# Patient Record
Sex: MALE | Race: OTHER
[De-identification: names, ages, dates, MRNs, and addresses within clinical notes are randomized per-mention and may not be internally consistent; named-entity substitution may affect disease eponyms.]

---

## 2017-07-02 ENCOUNTER — HOSPITAL ENCOUNTER (EMERGENCY)
Dept: HOSPITAL 62 - ER | Age: 47
Discharge: ANOTHER HOSPITAL | End: 2017-07-02
Payer: SELF-PAY

## 2017-07-02 VITALS — SYSTOLIC BLOOD PRESSURE: 119 MMHG | DIASTOLIC BLOOD PRESSURE: 75 MMHG
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
## 2017-07-02 DIAGNOSIS — T18.108A: Primary | ICD-10-CM

## 2017-07-02 LAB
ALBUMIN SERPL-MCNC: 4.6 G/DL (ref 3.5–5)
ALP SERPL-CCNC: 89 U/L (ref 38–126)
ALT SERPL-CCNC: 69 U/L (ref 21–72)
ANION GAP SERPL CALC-SCNC: 12 MMOL/L (ref 5–19)
AST SERPL-CCNC: 44 U/L (ref 17–59)
BASOPHILS # BLD AUTO: 0.1 10^3/UL (ref 0–0.2)
BASOPHILS NFR BLD AUTO: 0.6 % (ref 0–2)
BILIRUB DIRECT SERPL-MCNC: 0.3 MG/DL (ref 0–0.4)
BILIRUB SERPL-MCNC: 0.7 MG/DL (ref 0.2–1.3)
BUN SERPL-MCNC: 12 MG/DL (ref 7–20)
CALCIUM: 9.6 MG/DL (ref 8.4–10.2)
CHLORIDE SERPL-SCNC: 101 MMOL/L (ref 98–107)
CO2 SERPL-SCNC: 26 MMOL/L (ref 22–30)
CREAT SERPL-MCNC: 1.05 MG/DL (ref 0.52–1.25)
EOSINOPHIL # BLD AUTO: 0.1 10^3/UL (ref 0–0.6)
EOSINOPHIL NFR BLD AUTO: 1.3 % (ref 0–6)
ERYTHROCYTE [DISTWIDTH] IN BLOOD BY AUTOMATED COUNT: 12.6 % (ref 11.5–14)
GLUCOSE SERPL-MCNC: 89 MG/DL (ref 75–110)
HCT VFR BLD CALC: 49 % (ref 37.9–51)
HGB BLD-MCNC: 16.5 G/DL (ref 13.5–17)
HGB HCT DIFFERENCE: 0.5
LYMPHOCYTES # BLD AUTO: 2.2 10^3/UL (ref 0.5–4.7)
LYMPHOCYTES NFR BLD AUTO: 24.7 % (ref 13–45)
MCH RBC QN AUTO: 31.7 PG (ref 27–33.4)
MCHC RBC AUTO-ENTMCNC: 33.6 G/DL (ref 32–36)
MCV RBC AUTO: 94 FL (ref 80–97)
MONOCYTES # BLD AUTO: 0.8 10^3/UL (ref 0.1–1.4)
MONOCYTES NFR BLD AUTO: 8.7 % (ref 3–13)
NEUTROPHILS # BLD AUTO: 5.8 10^3/UL (ref 1.7–8.2)
NEUTS SEG NFR BLD AUTO: 64.7 % (ref 42–78)
POTASSIUM SERPL-SCNC: 4.1 MMOL/L (ref 3.6–5)
PROT SERPL-MCNC: 8.1 G/DL (ref 6.3–8.2)
RBC # BLD AUTO: 5.19 10^6/UL (ref 4.35–5.55)
SODIUM SERPL-SCNC: 138.7 MMOL/L (ref 137–145)
WBC # BLD AUTO: 8.9 10^3/UL (ref 4–10.5)

## 2017-07-02 PROCEDURE — 99284 EMERGENCY DEPT VISIT MOD MDM: CPT

## 2017-07-02 PROCEDURE — 80053 COMPREHEN METABOLIC PANEL: CPT

## 2017-07-02 PROCEDURE — 71020: CPT

## 2017-07-02 PROCEDURE — 93010 ELECTROCARDIOGRAM REPORT: CPT

## 2017-07-02 PROCEDURE — 85025 COMPLETE CBC W/AUTO DIFF WBC: CPT

## 2017-07-02 PROCEDURE — 96374 THER/PROPH/DIAG INJ IV PUSH: CPT

## 2017-07-02 PROCEDURE — 70360 X-RAY EXAM OF NECK: CPT

## 2017-07-02 PROCEDURE — 36415 COLL VENOUS BLD VENIPUNCTURE: CPT

## 2017-07-02 PROCEDURE — 93005 ELECTROCARDIOGRAM TRACING: CPT

## 2017-07-02 NOTE — RADIOLOGY REPORT (SQ)
EXAM DESCRIPTION:  CHEST PA/LAT; SOFT TISSUE NECK



COMPLETED DATE/TIME:  7/2/2017 4:39 pm



REASON FOR STUDY:  Pt swallowed metal from wire brush



COMPARISON:  None.



FINDINGS:  Two views of the neck for soft tissue detail:  Reveal a roughly 9 mm curvilinear metallic 
presumed foreign body, lateral view only posterior to the thyroid cartilage.  Presumably correlating 
with the ingested metallic wire.

Two view chest:  Clear lungs.  No other foreign bodies identified.  No pneumothorax.



IMPRESSION:  Presumed foreign body, lateral view only, neck image.  This appears to be in the upper e
sophagus.



TECHNICAL DOCUMENTATION:  JOB ID:  [id number]

## 2017-07-02 NOTE — ER DOCUMENT REPORT
HPI





- HPI


Pain Level: 5


Notes: 


Patient is a 46-year-old male presents to the ED complaining of possible metal 

foreign body ingestion yesterday while eating a hot dog.  Patient states that 

he believes a piece of metal from his wire brush ended up in his food that he 

swallowed.  Patient states he can feel something in his throat near the base of 

his neck when he turns his head to the left he can also feel poking.  Patient 

states he still able to eat and drink every time he swallows he feels like 

there is something stuck in his throat.  Otherwise he is still eating and 

drinking with no problems.  Denies any fever, headaches, URI, sore throat, cough

, wheeze, dyspnea, shortness of breath, chest pain, palpitations, syncope, 

abdominal pain, nausea/vomiting/diarrhea, dysuria, or rash.





- ROS


Notes: 


REVIEW OF SYSTEMS:


CONSTITUTIONAL :  Denies fever,  chills, or sweats.  Denies recent illness.


EENT:   see HPI


CARDIOVASCULAR:  Denies chest pain.  Denies palpitations or racing or irregular 

heart beat.  Denies ankle edema.


RESPIRATORY:  Denies cough, cold, or chest congestion.  Denies shortness of 

breath, difficulty breathing, or wheezing.


GASTROINTESTINAL:  Denies abdominal pain or distention.  Denies nausea, vomiting

, or diarrhea.  Denies blood in vomitus, stools, or per rectum.  Denies black, 

tarry stools.  Denies constipation.  


GENITOURINARY:  Denies difficulty urinating, painful urination, burning, 

frequency, blood in urine, or discharge.


MUSCULOSKELETAL:  Denies back or neck pain or stiffness.  Denies joint pain or 

swelling.


SKIN:   Denies rash, lesions or sores.


NEUROLOGICAL:  Denies dizziness or lightheadedness.  Denies headache.   Denies 

problems with gait or speech.  Denies sensory loss, numbness, or tingling.  





ALL OTHER SYSTEMS REVIEWED AND NEGATIVE.





Dictation was performed using Dragon voice recognition software





- DERM


Skin Color: Normal





Past Medical History





- Social History


Smoking Status: Unknown if Ever Smoked


Family History: Reviewed & Not Pertinent, Other - Adopted


Patient has suicidal ideation: No


Patient has homicidal ideation: No





- Past Medical History


Cardiac Medical History: 


   Denies: Hx Coronary Artery Disease, Hx Heart Attack, Hx Hypertension


Pulmonary Medical History: 


   Denies: Hx Asthma, Hx Bronchitis, Hx COPD, Hx Pneumonia


Neurological Medical History: Denies: Hx Cerebrovascular Accident, Hx Seizures


Renal/ Medical History: Denies: Hx Peritoneal Dialysis


GI Medical History: Reports: Hx Gastroesophageal Reflux Disease


Musculoskeltal Medical History: Reports Hx Arthritis - ankle from injury


Past Surgical History: Reports: Hx Orthopedic Surgery - left ankle





- Immunizations


Hx Diphtheria, Pertussis, Tetanus Vaccination: No





Vertical Provider Document





- CONSTITUTIONAL


Notes: 


PHYSICAL EXAMINATION:





GENERAL: Well-appearing, well-nourished and in no acute distress.





HEAD: Atraumatic, normocephalic.





EYES: Pupils equal round and reactive to light, extraocular movements intact, 

sclera anicteric, conjunctiva are normal.





ENT: EAC clear b/l.  TM's intact b/l without erythema, fluid, or perforation.  

Nares patent and without discharge.  oropharynx clear without exudates.  No 

tonsilar hypertrophy or erythema.  Moist mucous membranes.  No sinus tenderness.





NECK: Normal range of motion, supple without lymphadenopathy.  No rigidity.  Non

-tender.





LUNGS: Breath sounds clear to auscultation bilaterally and equal.  No wheezes 

rales or rhonchi.





HEART: Regular rate and rhythm without murmurs, rubs, gallops.





ABDOMEN: Soft, nontender, nondistended abdomen.  No guarding, no rebound.  No 

masses appreciated.  Normal bowel sounds present.  No CVA tenderness 

bilaterally.





PSYCH: Normal mood, normal affect.





SKIN: Warm, Dry, normal turgor, no rashes or lesions noted.





- INFECTION CONTROL


TRAVEL OUTSIDE OF THE [place] IN LAST 30 DAYS: No





- RESPIRATORY


O2 Sat by Pulse Oximetry: 98





Course





- Re-evaluation


Re-evalutation: 


07/02/17 1900


Reviewed case with Dr. Madera:


Patient is an afebrile, well-hydrated, 46-year-old male who presents to the ED 

with a foreign body to his esophagus suspected to be the reported piece of 

metal from his wire brush.  Vitals are stable.  PE otherwise unremarkable.  

There is no respiratory compromise or distress at this time.  X-rays of the 

neck and the chest show that there is an 8.5-9 mm foreign body to the upper 

esophagus near the base of his neck.  Patient warrants transfer as our general 

surgeon does not do scopes.  CBC, CMP, EKG were ordered and all negative/

unremarkable.  Discussed case with Dr. Janusz Gallo, ENT, of [facility] who accepted patient on an ED to ED transfer.  Patient 

is in agreement with plan.  Risks and benefits reviewed about transfer and 

needing to get the foreign body out of his esophagus including inability to pass

, tears, puncture wound(s), and infection.  








07/02/17 20:30


Pt was re-evaluated, vital signs stable, no current complaints. EMS arrived and 

pt is stable for transport.











- Vital Signs


Vital signs: 


 











Temp Pulse Resp BP Pulse Ox


 


 97.7 F   81   24 H  143/82 H  98 


 


 07/02/17 15:36  07/02/17 15:36  07/02/17 15:36  07/02/17 15:36  07/02/17 15:36














- Laboratory


Result Diagrams: 


 07/02/17 18:15





 07/02/17 18:15





Discharge





- Discharge


Clinical Impression: 


Foreign body in esophagus


Qualifiers:


 Encounter type: initial encounter Qualified Code(s): T18.108A - Unspecified 

foreign body in esophagus causing other injury, initial encounter





Condition: Stable


Disposition: [facility]


Additional Instructions: 


Instructions per ENT after Transfer and Procedure.

## 2017-07-02 NOTE — RADIOLOGY REPORT (SQ)
EXAM DESCRIPTION:  CHEST PA/LAT; SOFT TISSUE NECK



COMPLETED DATE/TIME:  7/2/2017 4:39 pm



REASON FOR STUDY:  Pt swallowed metal from wire brush



COMPARISON:  None.



FINDINGS:  Two views of the neck for soft tissue detail:  Reveal a roughly 9 mm curvilinear metallic 
presumed foreign body, lateral view only posterior to the thyroid cartilage.  Presumably correlating 
with the ingested metallic wire.

Two view chest:  Clear lungs.  No other foreign bodies identified.  No pneumothorax.



IMPRESSION:  Presumed foreign body, lateral view only, neck image.  This appears to be in the upper e
sophagus.



TECHNICAL DOCUMENTATION:  JOB ID:  [id number]

## 2017-07-02 NOTE — EKG REPORT
SEVERITY:- OTHERWISE NORMAL ECG -

SINUS RHYTHM

LEFT AXIS DEVIATION

:

Confirmed by: John Huerta 02-Jul-2017 21:39:55

## 2020-07-06 ENCOUNTER — HOSPITAL ENCOUNTER (EMERGENCY)
Dept: HOSPITAL 62 - ER | Age: 50
Discharge: HOME | End: 2020-07-06
Payer: COMMERCIAL

## 2020-07-06 VITALS — DIASTOLIC BLOOD PRESSURE: 93 MMHG | SYSTOLIC BLOOD PRESSURE: 148 MMHG

## 2020-07-06 DIAGNOSIS — M10.9: ICD-10-CM

## 2020-07-06 DIAGNOSIS — K59.00: ICD-10-CM

## 2020-07-06 DIAGNOSIS — R10.9: ICD-10-CM

## 2020-07-06 DIAGNOSIS — K52.9: Primary | ICD-10-CM

## 2020-07-06 LAB
ADD MANUAL DIFF: NO
ALBUMIN SERPL-MCNC: 4.8 G/DL (ref 3.5–5)
ALP SERPL-CCNC: 87 U/L (ref 38–126)
ANION GAP SERPL CALC-SCNC: 9 MMOL/L (ref 5–19)
APPEARANCE UR: CLEAR
APTT PPP: YELLOW S
AST SERPL-CCNC: 35 U/L (ref 17–59)
BARBITURATES UR QL SCN: NEGATIVE
BASOPHILS # BLD AUTO: 0.1 10^3/UL (ref 0–0.2)
BASOPHILS NFR BLD AUTO: 0.5 % (ref 0–2)
BILIRUB DIRECT SERPL-MCNC: 0 MG/DL (ref 0–0.4)
BILIRUB SERPL-MCNC: 1 MG/DL (ref 0.2–1.3)
BILIRUB UR QL STRIP: NEGATIVE
BUN SERPL-MCNC: 17 MG/DL (ref 7–20)
CALCIUM: 9.7 MG/DL (ref 8.4–10.2)
CHLORIDE SERPL-SCNC: 101 MMOL/L (ref 98–107)
CO2 SERPL-SCNC: 25 MMOL/L (ref 22–30)
EOSINOPHIL # BLD AUTO: 0.1 10^3/UL (ref 0–0.6)
EOSINOPHIL NFR BLD AUTO: 0.8 % (ref 0–6)
ERYTHROCYTE [DISTWIDTH] IN BLOOD BY AUTOMATED COUNT: 12.7 % (ref 11.5–14)
ETHANOL SERPL-MCNC: < 10 MG/DL
GLUCOSE SERPL-MCNC: 109 MG/DL (ref 75–110)
GLUCOSE UR STRIP-MCNC: NEGATIVE MG/DL
HCT VFR BLD CALC: 45.1 % (ref 37.9–51)
HGB BLD-MCNC: 16 G/DL (ref 13.5–17)
KETONES UR STRIP-MCNC: NEGATIVE MG/DL
LYMPHOCYTES # BLD AUTO: 1.1 10^3/UL (ref 0.5–4.7)
LYMPHOCYTES NFR BLD AUTO: 10 % (ref 13–45)
MCH RBC QN AUTO: 32.8 PG (ref 27–33.4)
MCHC RBC AUTO-ENTMCNC: 35.4 G/DL (ref 32–36)
MCV RBC AUTO: 93 FL (ref 80–97)
METHADONE UR QL SCN: NEGATIVE
MONOCYTES # BLD AUTO: 0.9 10^3/UL (ref 0.1–1.4)
MONOCYTES NFR BLD AUTO: 7.6 % (ref 3–13)
NEUTROPHILS # BLD AUTO: 9.3 10^3/UL (ref 1.7–8.2)
NEUTS SEG NFR BLD AUTO: 81.1 % (ref 42–78)
NITRITE UR QL STRIP: NEGATIVE
PCP UR QL SCN: NEGATIVE
PH UR STRIP: 6 [PH] (ref 5–9)
PLATELET # BLD: 141 10^3/UL (ref 150–450)
POTASSIUM SERPL-SCNC: 3.9 MMOL/L (ref 3.6–5)
PROT SERPL-MCNC: 8.1 G/DL (ref 6.3–8.2)
PROT UR STRIP-MCNC: NEGATIVE MG/DL
RBC # BLD AUTO: 4.87 10^6/UL (ref 4.35–5.55)
SP GR UR STRIP: 1.02
TOTAL CELLS COUNTED % (AUTO): 100 %
URATE SERPL-MCNC: 9.5 MG/DL (ref 3.5–8.5)
URINE AMPHETAMINES SCREEN: NEGATIVE
URINE BENZODIAZEPINES SCREEN: NEGATIVE
URINE COCAINE SCREEN: NEGATIVE
URINE MARIJUANA (THC) SCREEN: (no result)
UROBILINOGEN UR-MCNC: NEGATIVE MG/DL (ref ?–2)
WBC # BLD AUTO: 11.4 10^3/UL (ref 4–10.5)

## 2020-07-06 PROCEDURE — 36415 COLL VENOUS BLD VENIPUNCTURE: CPT

## 2020-07-06 PROCEDURE — 83605 ASSAY OF LACTIC ACID: CPT

## 2020-07-06 PROCEDURE — 83690 ASSAY OF LIPASE: CPT

## 2020-07-06 PROCEDURE — 99284 EMERGENCY DEPT VISIT MOD MDM: CPT

## 2020-07-06 PROCEDURE — 80307 DRUG TEST PRSMV CHEM ANLYZR: CPT

## 2020-07-06 PROCEDURE — 80053 COMPREHEN METABOLIC PANEL: CPT

## 2020-07-06 PROCEDURE — 96361 HYDRATE IV INFUSION ADD-ON: CPT

## 2020-07-06 PROCEDURE — 84550 ASSAY OF BLOOD/URIC ACID: CPT

## 2020-07-06 PROCEDURE — 85025 COMPLETE CBC W/AUTO DIFF WBC: CPT

## 2020-07-06 PROCEDURE — 74177 CT ABD & PELVIS W/CONTRAST: CPT

## 2020-07-06 PROCEDURE — 96376 TX/PRO/DX INJ SAME DRUG ADON: CPT

## 2020-07-06 PROCEDURE — 96365 THER/PROPH/DIAG IV INF INIT: CPT

## 2020-07-06 PROCEDURE — 81001 URINALYSIS AUTO W/SCOPE: CPT

## 2020-07-06 PROCEDURE — 96375 TX/PRO/DX INJ NEW DRUG ADDON: CPT

## 2020-07-06 NOTE — RADIOLOGY REPORT (SQ)
EXAM DESCRIPTION:  CT ABD/PELVIS WITH IV   ORAL



IMAGES COMPLETED DATE/TIME:  7/6/2020 10:54 am



REASON FOR STUDY:  left lower quadrant pain/prior diverticulitis

 .  Right and left lower quadrant abdominal pain.



COMPARISON:  1/5/2016, 8/24/2015



TECHNIQUE:  CT scan of the abdomen and pelvis performed using helical scanning technique with dynamic
 intravenous contrast injection.  No oral contrast. Images reviewed with lung, soft tissue, and bone 
windows. Reconstructed coronal and sagittal MPR images reviewed. Delayed images for evaluation of the
 urinary system also acquired. All images stored on PACS.

All CT scanners at this facility use dose modulation, iterative reconstruction, and/or weight based d
osing when appropriate to reduce radiation dose to as low as reasonably achievable (ALARA).

[facility]: Dose Right  [facility]: CareDose    [facility]: Dose Right    [facility]: Teradose 4D    [facility]: Smart Technologies



CONTRAST TYPE AND DOSE:  contrast/concentration: Isovue 350.00 mmol/ml; Total Contrast Delivered: 100
.0 ml; Total Saline Delivered: 70.0 ml



RENAL FUNCTION:  GFR > 60.



RADIATION DOSE:  CT Rad equipment meets quality standard of care and radiation dose reduction techniq
ues were employed. CTDIvol: 17.0 - 20.1 mGy. DLP: 2090 mGy-cm..



LIMITATIONS:  None



FINDINGS:  LOWER CHEST: No significant findings. No nodules or infiltrates.

LIVER: The liver is enlarged measuring 26.3 cm craniocaudal at the midclavicular line.  Moderate diff
use hepatic steatosis.  1 cm hepatic cyst in the right hepatic lobe is unchanged.  No suspicious hepa
tic mass.  Hepatic and portal veins are patent.  No biliary ductal dilation.

SPLEEN: The spleen is mildly enlarged measuring 13.4 cm craniocaudal at the midclavicular line.  No f
ocal mass or perisplenic fluid.

PANCREAS: No masses. No significant calcifications. No adjacent inflammation or peripancreatic fluid 
collections. Pancreatic duct not dilated.

GALLBLADDER: No identified stones by CT criteria. No inflammatory changes to suggest cholecystitis.

ADRENAL GLANDS: No significant masses or asymmetry.

RIGHT KIDNEY AND URETER: Right inferior pole parapelvic cyst.  No solid mass.   No significant calcif
ications.   No hydronephrosis or hydroureter.

LEFT KIDNEY AND URETER: No solid masses.   No significant calcifications.   No hydronephrosis or hydr
oureter.

AORTA AND VESSELS: No aneurysm. No dissection. Renal arteries, SMA, celiac without stenosis.

RETROPERITONEUM: No retroperitoneal adenopathy, hemorrhage or masses.

BOWEL AND PERITONEAL CAVITY: There is sigmoid colon wall thickening and surrounding inflammatory ernst
ge involving a long segment of the sigmoid colon to the level of the rectum.  A few scattered colonic
 diverticula without acute diverticulitis.  There is no bowel obstruction.  The proximal colon has a 
normal appearance.  Small hiatal hernia.  No ascites or pneumoperitoneum.

APPENDIX: Normal.

PELVIS: Prostate has normal size.  Urinary bladder is relatively decompressed.  Despite underdistenti
on, there is diffuse bladder wall thickening, which may be reactive secondary to inflammatory change 
in the pelvis.  Bladder wall measures up to 11 mm thickness.

ABDOMINAL WALL: No masses. No hernias.

BONES: No significant or acute findings.

OTHER: No other significant finding.



IMPRESSION:

1. Acute sigmoid colitis, which could be seen with infectious or inflammatory colitis such as Crohn d
isease.  Clinical correlation and correlation with endoscopy after treatment for acute inflammatory c
hange is recommended.  No perforation or peritoneal abscess.

2. Bladder wall thickening which may be reactive secondary to the inflammatory change adjacent at the
 sigmoid colon.  No focal bladder mass.

3. Small hiatal hernia.



TECHNICAL DOCUMENTATION:  JOB ID:  [id number]

Quality ID # [id number]: Final reports with documentation of one or more dose reduction techniques (e.g., Au
tomated exposure control, adjustment of the mA and/or kV according to patient size, use of iterative 
reconstruction technique)

 2011 [employer]- All Rights Reserved



Reading location - IP/workstation name: [id number]

## 2020-07-06 NOTE — ER DOCUMENT REPORT
Entered by CATHERINE RODRÍGUEZ SCRIBE  07/06/20 0916 





Acting as scribe for:MARLENE JACKSON MD





ED GI/





- General


Chief Complaint: Abdominal Pain


Stated Complaint: ABDOMINAL AND BACK PAIN


Time Seen by Provider: 07/06/20 08:39


Information source: Patient


Notes: 





This 49 year old male patient presents to the emergency department today with 

complaints of abdominal pain. Patient states he has a history of diverticulitis 

and this pain is similar. Patient states his urine is normal and reports 

constipation. Patient states he feels like he needs to pass a bowel movement 

every x30 min and strains. Patient states he mostly passes gas and denies blood 

in his stool. Patient states his bowel movement was normal x3 days ago and began

to have problems yesterday. Denies fever, chills, or vomiting. Patient also 

reports some pain in his right foot and denies injury.


TRAVEL OUTSIDE OF THE [place] IN LAST 30 DAYS: No





- Related Data


Allergies/Adverse Reactions: 


                                        





No Known Allergies Allergy (Verified 07/02/17 15:36)


   











Past Medical History





- General


Information source: Patient





- Social History


Smoking Status: Never Smoker


Cigarette use (# per day): No


Chew tobacco use (# tins/day): No


Frequency of alcohol use: Occasional


Drug Abuse: Marijuana


Family History: Reviewed & Not Pertinent, Other - Adopted


Patient has homicidal ideation: No


GI Medical History: Reports: Hx Diverticulitis, Hx Gastroesophageal Reflux 

Disease


Musculoskeletal Medical History: Reports Hx Arthritis - ankle from injury


Past Surgical History: Reports: Hx Orthopedic Surgery - left ankle





- Immunizations


Hx Diphtheria, Pertussis, Tetanus Vaccination: No





Review of Systems





- Review of Systems


Constitutional: See HPI.  denies: Chills, Fever


EENT: No symptoms reported


Cardiovascular: No symptoms reported


Respiratory: No symptoms reported


Gastrointestinal: See HPI, Abdominal pain, Constipation.  denies: Vomiting, 

Blood streaked bowels


Genitourinary: See HPI


Male Genitourinary: No symptoms reported


Musculoskeletal: See HPI


Skin: No symptoms reported


Hematologic/Lymphatic: No symptoms reported


Neurological/Psychological: No symptoms reported


-: Yes All other systems reviewed and negative





Physical Exam





- Vital signs


Vitals: 


                                        











Temp Pulse Resp BP Pulse Ox


 


 98.5 F   89   18   155/81 H  99 


 


 07/06/20 06:38  07/06/20 06:38  07/06/20 06:38  07/06/20 06:38  07/06/20 06:38














- General


General appearance: Appears well, Alert





- HEENT


Head: Normocephalic, Atraumatic


Eyes: Normal


Pupils: PERRL





- Respiratory


Respiratory status: No respiratory distress


Chest status: Nontender


Breath sounds: Normal


Chest palpation: Normal





- Cardiovascular


Rhythm: Regular


Heart sounds: Normal auscultation


Murmur: No





- Abdominal


Inspection: Normal


Distension: No distension


Bowel sounds: Normal


Tenderness: Tender - LLQ, Guarding.  No: Rebound


Notes: 


Psoriasis rash on abdomen.





- Extremities


General upper extremity: Normal inspection.  No: Edema


General lower extremity: Normal inspection.  No: Edema





- Neurological


Neuro grossly intact: Yes


Cognition: Normal


Orientation: AAOx4


Speech: Normal





- Psychological


Associated symptoms: Normal affect, Normal mood





- Skin


Skin Temperature: Warm


Skin Moisture: Dry


Skin Color: Normal





Course





- Re-evaluation


Re-evalutation: 





07/06/20 16:49


Patient reports his pain is better however still present in the left lower q

uadrant region.  Patient was diagnosed with colitis based on CT scan of abdomen 

and pelvis.  Patient has colitis of the sigmoid colon.  This is a new diagnosis 

for patient inasmuch as he has had diverticulitis in the past.  Patient does not

have any rectal bleeding.





- Vital Signs


Vital signs: 


                                        











Temp Pulse Resp BP Pulse Ox


 


 98.1 F   80   16   152/82 H  99 


 


 07/06/20 10:49  07/06/20 10:49  07/06/20 10:49  07/06/20 10:49  07/06/20 10:49











07/06/20 16:50


Vital signs stable





- Laboratory


Result Diagrams: 


                                 07/06/20 08:20





                                 07/06/20 08:20


Laboratory results interpreted by me: 


                                        











  07/06/20 07/06/20 07/06/20





  08:20 08:20 08:20


 


WBC  11.4 H  


 


Plt Count  141 L  


 


Lymph % (Auto)  10.0 L  


 


Absolute Neuts (auto)  9.3 H  


 


Seg Neutrophils %  81.1 H  


 


Sodium   135.2 L 


 


Uric Acid    9.5 H











07/06/20 16:50


Laboratories within normal limits patient has a uric acid level of 9.5.





- Diagnostic Test


Radiology reviewed: Image reviewed, Reports reviewed


Radiology results interpreted by me: 





07/06/20 16:50


CT scan of abdomen and pelvis with contrast IV and oral shows sigmoid colon 

colitis noted.  No perforation no other significant findings.





Discharge





- Discharge


Clinical Impression: 


 Colitis, Abdominal pain, Gouty arthritis of right great toe





Condition: Stable


Disposition: HOME, SELF-CARE


Instructions:  Abdominal Pain ([facility]), Bulk Laxatives, Oral Narcotic Medication 

([facility])


Additional Instructions: 


.  Colitis, Nonspecific





     Colitis is an inflammatory disease of the large intestine which affects the

lining of the bowel.  The cause is uncertain, though it is often caused by an 

infection.  In some cases, the symptoms resolve and can return again in the 

future.


     Colitis is characterized by abdominal pain, often nausea and vomiting, and 

either diarrhea or difficulty with bowel movements.  Sometimes blood will be 

present in the bowel movements.  Fever is often present as well.


     Milder cases of colitis can be managed as an outpatient with medications 

for nausea and vomiting and pain, oral fluid therapy, and perhaps antibiotics, 

if a bacterial origin is suspected.  Antidiarrhea medicine should usually be 

avoided in colitis.


     If you have increasing abdominal pain, repeated vomiting, fever, rectal 

bleeding, or worsening diarrhea, you should return for re-evaluation.





At this point time you have a nonspecific colitis.  You are going to need 

follow-up with the gastrointestinal specialist which we will refer you to.  We 

will place you on antibiotics medications and bulk laxatives as needed.  Also 

you will be on a treatment for uric acid elevation as well.


Prescriptions: 


Ciprofloxacin HCl [Cipro 500 mg Tablet] 500 mg PO BID #20 tablet


Docusate Sodium [Colace 100 mg Capsule] 100 mg PO BID #60 capsule


Hydrocodone/Acetaminophen [Norco 5-325 mg Tablet] 1 tab PO QID PRN #10 tablet


 PRN Reason: 


Allopurinol [Zyloprim 300 mg Tablet] 300 mg PO DAILY #30 tablet


Forms:  Return to Work


Referrals: 


ZANDER WISEMAN MD [ACTIVE STAFF] - Follow up in 3-5 days





I personally performed the services described in the documentation, reviewed and

edited the documentation which was dictated to the scribe in my presence, and it

accurately records my words and actions.

## 2020-07-14 ENCOUNTER — HOSPITAL ENCOUNTER (INPATIENT)
Dept: HOSPITAL 62 - ER | Age: 50
LOS: 3 days | Discharge: HOME | DRG: 392 | End: 2020-07-17
Attending: NURSE PRACTITIONER | Admitting: INTERNAL MEDICINE
Payer: COMMERCIAL

## 2020-07-14 DIAGNOSIS — F12.90: ICD-10-CM

## 2020-07-14 DIAGNOSIS — K21.9: ICD-10-CM
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
## 2020-07-14 DIAGNOSIS — D72.829: ICD-10-CM

## 2020-07-14 DIAGNOSIS — F17.210: ICD-10-CM

## 2020-07-14 DIAGNOSIS — K57.32: Primary | ICD-10-CM

## 2020-07-14 DIAGNOSIS — F10.20: ICD-10-CM

## 2020-07-14 LAB
ADD MANUAL DIFF: NO
ALBUMIN SERPL-MCNC: 4.7 G/DL (ref 3.5–5)
ALP SERPL-CCNC: 95 U/L (ref 38–126)
ANION GAP SERPL CALC-SCNC: 9 MMOL/L (ref 5–19)
APPEARANCE UR: (no result)
APTT PPP: (no result) S
AST SERPL-CCNC: 31 U/L (ref 17–59)
BARBITURATES UR QL SCN: NEGATIVE
BASOPHILS # BLD AUTO: 0.1 10^3/UL (ref 0–0.2)
BASOPHILS NFR BLD AUTO: 0.5 % (ref 0–2)
BILIRUB DIRECT SERPL-MCNC: 0 MG/DL (ref 0–0.4)
BILIRUB SERPL-MCNC: 1.2 MG/DL (ref 0.2–1.3)
BILIRUB UR QL STRIP: NEGATIVE
BUN SERPL-MCNC: 16 MG/DL (ref 7–20)
CALCIUM: 10.1 MG/DL (ref 8.4–10.2)
CHLORIDE SERPL-SCNC: 101 MMOL/L (ref 98–107)
CO2 SERPL-SCNC: 26 MMOL/L (ref 22–30)
EOSINOPHIL # BLD AUTO: 0.1 10^3/UL (ref 0–0.6)
EOSINOPHIL NFR BLD AUTO: 0.5 % (ref 0–6)
ERYTHROCYTE [DISTWIDTH] IN BLOOD BY AUTOMATED COUNT: 12 % (ref 11.5–14)
GLUCOSE SERPL-MCNC: 117 MG/DL (ref 75–110)
GLUCOSE UR STRIP-MCNC: NEGATIVE MG/DL
HCT VFR BLD CALC: 44.5 % (ref 37.9–51)
HGB BLD-MCNC: 15.7 G/DL (ref 13.5–17)
KETONES UR STRIP-MCNC: NEGATIVE MG/DL
LYMPHOCYTES # BLD AUTO: 1.1 10^3/UL (ref 0.5–4.7)
LYMPHOCYTES NFR BLD AUTO: 7.5 % (ref 13–45)
MCH RBC QN AUTO: 32.6 PG (ref 27–33.4)
MCHC RBC AUTO-ENTMCNC: 35.2 G/DL (ref 32–36)
MCV RBC AUTO: 93 FL (ref 80–97)
METHADONE UR QL SCN: NEGATIVE
MONOCYTES # BLD AUTO: 1.4 10^3/UL (ref 0.1–1.4)
MONOCYTES NFR BLD AUTO: 9.8 % (ref 3–13)
NEUTROPHILS # BLD AUTO: 12 10^3/UL (ref 1.7–8.2)
NEUTS SEG NFR BLD AUTO: 81.7 % (ref 42–78)
NITRITE UR QL STRIP: NEGATIVE
PCP UR QL SCN: NEGATIVE
PH UR STRIP: 5 [PH] (ref 5–9)
PLATELET # BLD: 250 10^3/UL (ref 150–450)
POTASSIUM SERPL-SCNC: 4.1 MMOL/L (ref 3.6–5)
PROT SERPL-MCNC: 8.2 G/DL (ref 6.3–8.2)
PROT UR STRIP-MCNC: 30 MG/DL
RBC # BLD AUTO: 4.81 10^6/UL (ref 4.35–5.55)
SP GR UR STRIP: 1.03
TOTAL CELLS COUNTED % (AUTO): 100 %
URINE AMPHETAMINES SCREEN: NEGATIVE
URINE BENZODIAZEPINES SCREEN: NEGATIVE
URINE COCAINE SCREEN: NEGATIVE
URINE MARIJUANA (THC) SCREEN: (no result)
UROBILINOGEN UR-MCNC: NEGATIVE MG/DL (ref ?–2)
WBC # BLD AUTO: 14.7 10^3/UL (ref 4–10.5)

## 2020-07-14 PROCEDURE — 96375 TX/PRO/DX INJ NEW DRUG ADDON: CPT

## 2020-07-14 PROCEDURE — 89055 LEUKOCYTE ASSESSMENT FECAL: CPT

## 2020-07-14 PROCEDURE — 80048 BASIC METABOLIC PNL TOTAL CA: CPT

## 2020-07-14 PROCEDURE — 85025 COMPLETE CBC W/AUTO DIFF WBC: CPT

## 2020-07-14 PROCEDURE — 82270 OCCULT BLOOD FECES: CPT

## 2020-07-14 PROCEDURE — 87045 FECES CULTURE AEROBIC BACT: CPT

## 2020-07-14 PROCEDURE — 74177 CT ABD & PELVIS W/CONTRAST: CPT

## 2020-07-14 PROCEDURE — 80053 COMPREHEN METABOLIC PANEL: CPT

## 2020-07-14 PROCEDURE — 81001 URINALYSIS AUTO W/SCOPE: CPT

## 2020-07-14 PROCEDURE — 85027 COMPLETE CBC AUTOMATED: CPT

## 2020-07-14 PROCEDURE — 93010 ELECTROCARDIOGRAM REPORT: CPT

## 2020-07-14 PROCEDURE — 83690 ASSAY OF LIPASE: CPT

## 2020-07-14 PROCEDURE — 93005 ELECTROCARDIOGRAM TRACING: CPT

## 2020-07-14 PROCEDURE — 87040 BLOOD CULTURE FOR BACTERIA: CPT

## 2020-07-14 PROCEDURE — 36415 COLL VENOUS BLD VENIPUNCTURE: CPT

## 2020-07-14 PROCEDURE — 96361 HYDRATE IV INFUSION ADD-ON: CPT

## 2020-07-14 PROCEDURE — 87205 SMEAR GRAM STAIN: CPT

## 2020-07-14 PROCEDURE — 96367 TX/PROPH/DG ADDL SEQ IV INF: CPT

## 2020-07-14 PROCEDURE — 99285 EMERGENCY DEPT VISIT HI MDM: CPT

## 2020-07-14 PROCEDURE — 96365 THER/PROPH/DIAG IV INF INIT: CPT

## 2020-07-14 PROCEDURE — 80307 DRUG TEST PRSMV CHEM ANLYZR: CPT

## 2020-07-14 PROCEDURE — 83605 ASSAY OF LACTIC ACID: CPT

## 2020-07-14 PROCEDURE — 96374 THER/PROPH/DIAG INJ IV PUSH: CPT

## 2020-07-14 PROCEDURE — 96376 TX/PRO/DX INJ SAME DRUG ADON: CPT

## 2020-07-14 PROCEDURE — 71045 X-RAY EXAM CHEST 1 VIEW: CPT

## 2020-07-14 RX ADMIN — OXYCODONE AND ACETAMINOPHEN PRN TAB: 5; 325 TABLET ORAL at 20:05

## 2020-07-14 RX ADMIN — DIAZEPAM SCH MG: 5 TABLET ORAL at 23:32

## 2020-07-14 RX ADMIN — HYDROMORPHONE HYDROCHLORIDE PRN MG: 2 INJECTION INTRAMUSCULAR; INTRAVENOUS; SUBCUTANEOUS at 17:23

## 2020-07-14 RX ADMIN — PIPERACILLIN AND TAZOBACTAM SCH MLS/HR: 3; .375 INJECTION, POWDER, LYOPHILIZED, FOR SOLUTION INTRAVENOUS; PARENTERAL at 19:50

## 2020-07-14 RX ADMIN — PIPERACILLIN AND TAZOBACTAM SCH MLS/HR: 3; .375 INJECTION, POWDER, LYOPHILIZED, FOR SOLUTION INTRAVENOUS; PARENTERAL at 23:32

## 2020-07-14 RX ADMIN — DIAZEPAM SCH MG: 5 TABLET ORAL at 18:34

## 2020-07-14 RX ADMIN — HYDROMORPHONE HYDROCHLORIDE PRN MG: 2 INJECTION INTRAMUSCULAR; INTRAVENOUS; SUBCUTANEOUS at 13:52

## 2020-07-14 RX ADMIN — FAMOTIDINE SCH MG: 20 TABLET, FILM COATED ORAL at 21:36

## 2020-07-14 RX ADMIN — HEPARIN SODIUM SCH: 5000 INJECTION, SOLUTION INTRAVENOUS; SUBCUTANEOUS at 21:37

## 2020-07-14 NOTE — RADIOLOGY REPORT (SQ)
EXAM DESCRIPTION:  CHEST SINGLE VIEW



IMAGES COMPLETED DATE/TIME:  7/14/2020 5:30 pm



REASON FOR STUDY:  abdominal pain/admission



COMPARISON:  7/2/2017



EXAM PARAMETERS:  NUMBER OF VIEWS: One view.

TECHNIQUE: Single frontal radiographic view of the chest acquired.

RADIATION DOSE: NA

LIMITATIONS: None.



FINDINGS:  LUNGS AND PLEURA: No opacities, masses or pneumothorax. No pleural effusion.

MEDIASTINUM AND HILAR STRUCTURES: No masses.  Contour normal.

HEART AND VASCULAR STRUCTURES: Heart normal in size.  Normal vasculature.

BONES: No acute findings.

HARDWARE: None in the chest.

OTHER: No other significant finding.



IMPRESSION:  1. NO ACUTE RADIOGRAPHIC FINDING IN THE CHEST.



TECHNICAL DOCUMENTATION:  JOB ID:  [id number]

 2011 [email or web address]- All Rights Reserved



Reading location - IP/workstation name: [email or web address]

## 2020-07-14 NOTE — PDOC H&P
History of Present Illness


Admission Date/PCP: 


  07/14/20 16:05





  





Patient complains of: abd pain


History of Present Illness: 


LYNDSEY CISNEROS is a 49 year old male with a past medical history significant

for diverticulitis with perforation, alcohol dependence with continuous use, and

marijuana use who presents to the emergency department today with a complaint of

1 week of progressively worsening abdominal discomfort.  Patient was seen 7 days

ago in the emergency department and found to have diverticulitis by CT; 

discharged home on Cipro.  Patient reports that he initially improved x2 days 

and then has steadily worsened since.  Reports small-volume, soft stools, 

associated with abdominal cramping.  He denies nausea vomiting diarrhea and 

constipation.





Evaluation emergency department revealed stable vital signs, leukocytosis (WBC 

14.7; increased from last week), unremarkable chemistry, normal urinalysis, 

negative occult stool, and UDS positive for THC only.





CT imaging showed diverticulitis.  Per ED provider, he discussed with surgery 

who recommended hospitalist admission.


He was provided IV Zosyn and vancomycin and referred to the hospitalist service 

for admission and management of the above complaints and findings.








Past Medical History


Cardiac Medical History: Reports: None


Pulmonary Medical History: Reports: None


EENT Medical History: Reports: None


Neurological Medical History: Reports: None


Endocrine Medical History: Reports: Obesity


Renal/ Medical History: Reports: None


Malignancy Medical History: Reports: None


GI Medical History: Reports: Diverticulitis, Gastroesophageal Reflux Disease


Musculoskeltal Medical History: Reports: Arthritis - ankle from injury


Skin Medical History: Reports: None


Psychiatric Medical History: Reports: Alcohol Dependency, Substance Abuse, 

Tobacco Dependency


Traumatic Medical History: Reports: None


Hematology: 


   Denies: Anemia


Infectious Medical History: Reports: None





Past Surgical History


Past Surgical History: Reports: Orthopedic Surgery - left ankle, Other - 

Percutaneous drain placement related to perforated diverticulum





Social History


Information Source: Patient


Lives with: Alone


Smoking Status: Never Smoker


Electronic Cigarette use?: No


Frequency of Alcohol Use: Heavy


Hx Recreational Drug Use: Yes


Drugs: Marijuana


Hx Prescription Drug Abuse: No





- Advance Directive


Resuscitation Status: Full Code





Family History


Family History: Reviewed & Not Pertinent


Parental Family History Reviewed: Yes


Children Family History Reviewed: Yes


Sibling(s) Family History Reviewed.: Yes





Medication/Allergy


Home Medications: 








Allopurinol [Zyloprim 300 mg Tablet] 300 mg PO DAILY #30 tablet 07/06/20 


Ciprofloxacin HCl [Cipro 500 mg Tablet] 500 mg PO BID #20 tablet 07/06/20 


Docusate Sodium [Colace 100 mg Capsule] 100 mg PO BID #60 capsule 07/06/20 








Allergies/Adverse Reactions: 


                                        





No Known Allergies Allergy (Verified 07/02/17 15:36)


   











Review of Systems


Constitutional: PRESENT: anorexia.  ABSENT: chills, fever(s), headache(s), 

weight gain, weight loss


Eyes: ABSENT: visual disturbances


Ears: ABSENT: hearing changes


Cardiovascular: ABSENT: chest pain, dyspnea on exertion, edema, orthropnea, 

palpitations


Respiratory: ABSENT: cough, hemoptysis


Gastrointestinal: PRESENT: abdominal pain, bloating.  ABSENT: constipation, 

diarrhea, hematemesis, hematochezia, nausea, vomiting


Genitourinary: ABSENT: dysuria, hematuria


Musculoskeletal: ABSENT: joint swelling


Integumentary: ABSENT: rash, wounds


Neurological: ABSENT: abnormal gait, abnormal speech, confusion, dizziness, 

focal weakness, syncope


Psychiatric: ABSENT: anxiety, depression, homidical ideation, suicidal ideation


Endocrine: ABSENT: cold intolerance, heat intolerance, polydipsia, polyuria


Hematologic/Lymphatic: ABSENT: easy bleeding, easy bruising





Physical Exam


Vital Signs: 


                                        











Temp Pulse Resp BP Pulse Ox


 


 98.3 F   84   16   141/72 H  99 


 


 07/14/20 06:45  07/14/20 06:45  07/14/20 06:45  07/14/20 06:45  07/14/20 06:45








                                 Intake & Output











 07/13/20 07/14/20 07/15/20





 06:59 06:59 06:59


 


Intake Total   1000


 


Balance   1000


 


Weight  100.698 kg 











General appearance: PRESENT: no acute distress, well-developed, well-nourished


Head exam: PRESENT: atraumatic, normocephalic


Eye exam: PRESENT: conjunctiva pink, EOMI, PERRLA.  ABSENT: scleral icterus


Mouth exam: PRESENT: moist, tongue midline


Respiratory exam: PRESENT: clear to auscultation stevie, symmetrical, unlabored.  

ABSENT: rales, rhonchi, wheezes


Cardiovascular exam: PRESENT: RRR.  ABSENT: diastolic murmur, rubs, systolic 

murmur


Vascular exam: PRESENT: normal capillary refill


GI/Abdominal exam: PRESENT: hyperactive bowel sounds, soft, tenderness.  ABSENT:

distended, guarding, mass, organolmegaly, rebound


Rectal exam: PRESENT: deferred


Extremities exam: PRESENT: full ROM.  ABSENT: calf tenderness, clubbing, pedal 

edema


Musculoskeletal exam: PRESENT: ambulatory


Neurological exam: PRESENT: alert, awake, oriented to person, oriented to place,

oriented to time, oriented to situation, CN II-XII grossly intact.  ABSENT: 

motor sensory deficit


Psychiatric exam: PRESENT: appropriate affect, normal mood.  ABSENT: homicidal 

ideation, suicidal ideation


Skin exam: PRESENT: dry, intact, warm.  ABSENT: cyanosis, rash





Results


Laboratory Results: 


                                        





                                 07/14/20 08:26 





                                 07/14/20 08:26 





                                        











  07/14/20 07/14/20 07/14/20





  08:24 08:26 08:26


 


WBC   14.7 H 


 


RBC   4.81 


 


Hgb   15.7 


 


Hct   44.5 


 


MCV   93 


 


MCH   32.6 


 


MCHC   35.2 


 


RDW   12.0 


 


Plt Count   250 


 


Seg Neutrophils %   81.7 H 


 


Sodium    136.0 L


 


Potassium    4.1


 


Chloride    101


 


Carbon Dioxide    26


 


Anion Gap    9


 


BUN    16


 


Creatinine    1.07


 


Est GFR ( Amer)    > 60


 


Glucose    117 H


 


Lactic Acid   


 


Calcium    10.1


 


Total Bilirubin    1.2


 


AST    31


 


Alkaline Phosphatase    95


 


Total Protein    8.2


 


Albumin    4.7


 


Lipase    49.3


 


Urine Color  DARK YELLOW  


 


Urine Appearance  SLIGHTLY-CLOUDY  


 


Urine pH  5.0  


 


Ur Specific Gravity  1.026  


 


Urine Protein  30 H  


 


Urine Glucose (UA)  NEGATIVE  


 


Urine Ketones  NEGATIVE  


 


Urine Blood  NEGATIVE  


 


Urine Nitrite  NEGATIVE  


 


Ur Leukocyte Esterase  NEGATIVE  


 


Urine WBC (Auto)  1  


 


Urine RBC (Auto)  0  














  07/14/20





  09:44


 


WBC 


 


RBC 


 


Hgb 


 


Hct 


 


MCV 


 


MCH 


 


MCHC 


 


RDW 


 


Plt Count 


 


Seg Neutrophils % 


 


Sodium 


 


Potassium 


 


Chloride 


 


Carbon Dioxide 


 


Anion Gap 


 


BUN 


 


Creatinine 


 


Est GFR (African Amer) 


 


Glucose 


 


Lactic Acid  1.0


 


Calcium 


 


Total Bilirubin 


 


AST 


 


Alkaline Phosphatase 


 


Total Protein 


 


Albumin 


 


Lipase 


 


Urine Color 


 


Urine Appearance 


 


Urine pH 


 


Ur Specific Gravity 


 


Urine Protein 


 


Urine Glucose (UA) 


 


Urine Ketones 


 


Urine Blood 


 


Urine Nitrite 


 


Ur Leukocyte Esterase 


 


Urine WBC (Auto) 


 


Urine RBC (Auto) 











Impressions: 


                                        





Abdomen/Pelvis CT  07/14/20 00:00


IMPRESSION:  Persistent inflammatory changes associated with abnormal sigmoid 

colon.  Differential is refractory diverticulitis versus necrosis associated 

with underlying mass.  No abscess.


 














Assessment and Plan





- Diagnosis


(1) Diverticulitis


Qualifiers: 


   Diverticulitis site: large intestine   Diverticulitis bleeding: without 

bleeding   Diverticulitis complication: without perforation or abscess   

Qualified Code(s): K57.32 - Diverticulitis of large intestine without 

perforation or abscess without bleeding   


Is this a current diagnosis for this admission?: Yes   


Plan: 


CT imaging shows persistent inflammatory changes to the sigmoid colon consistent

with refractory diverticulitis.


Failed outpatient p.o. Cipro.


Blood cultures pending.


Stool cultures pending.





Patient is admitted to the medical floor.


He is placed on IV vancomycin and Zosyn.  Will adjust as cultures result.


Clear liquid diet; advance slowly as tolerated.


Antiemetics and analgesics as needed.








(2) Alcohol dependence


Is this a current diagnosis for this admission?: Yes   


Plan: 


Alcohol cessation is encouraged.


Patient is placed on daily thiamine and folic acid supplementation.


He is placed on scheduled Valium 5 mg every 6 hours.


PRN p.o. Ativan as needed for anxiety/agitation








(3) Marijuana use


Is this a current diagnosis for this admission?: Yes   


Plan: 


Cessation encouraged.








(4) Leukocytosis


Is this a current diagnosis for this admission?: Yes   


Plan: 


Secondary to #1.


Evaluation management as above.








- Time


Time Spent with patient: 35 or more minutes


Medications reviewed and adjusted accordingly: Yes


Anticipated discharge: Home


Within: within 48 hours

## 2020-07-14 NOTE — EKG REPORT
SEVERITY:- OTHERWISE NORMAL ECG -

SINUS RHYTHM

BORDERLINE LEFT AXIS DEVIATION

:

Confirmed by: Patrick Boudreaux MD 14-Jul-2020 21:46:46

## 2020-07-14 NOTE — RADIOLOGY REPORT (SQ)
EXAM DESCRIPTION:  CT ABD/PELVIS WITH IV   ORAL



IMAGES COMPLETED DATE/TIME:  7/14/2020 11:32 am



REASON FOR STUDY:  abd pain



COMPARISON:  7/6/2020



TECHNIQUE:  CT scan of the abdomen and pelvis performed with intravenous and oral contrast using keenan
sintia scanning technique with dynamic intravenous contrast injection. Images reviewed with lung, soft t
issue, and bone windows. Reconstructed coronal and sagittal MPR images reviewed. Delayed images for e
valuation of the urinary system also acquired. All images stored on PACS.

All CT scanners at this facility use dose modulation, iterative reconstruction, and/or weight based d
osing when appropriate to reduce radiation dose to as low as reasonably achievable (ALARA).

[facility]: Dose Right  [facility]: CareDose    [facility]: Dose Right    [facility]: Teradose 4D    [facility]: Smart Technologies



CONTRAST TYPE AND DOSE:  contrast/concentration: Isovue 350.00 mmol/ml; Total Contrast Delivered: 100
.0 ml; Total Saline Delivered: 72.0 ml



RENAL FUNCTION:  GFR > 60.



RADIATION DOSE:  CT Rad equipment meets quality standard of care and radiation dose reduction techniq
ues were employed. CTDIvol: 17.5 - 20.0 mGy. DLP: 2221 mGy-cm. .



LIMITATIONS:  None.



FINDINGS:  Since 7/6/2020, acute findings are limited to the pelvis.  Persistent inflammatory changes
 associated with thickened loop of sigmoid colon just superior to the urinary bladder.  No organized 
gas fluid collection.  No dilated loops.  Small retroperitoneal nodes measuring up to 1 cm in short a
xis.  No ascites.



IMPRESSION:  Persistent inflammatory changes associated with abnormal sigmoid colon.  Differential is
 refractory diverticulitis versus necrosis associated with underlying mass.  No abscess.



TECHNICAL DOCUMENTATION:  JOB ID:  [id number]

Quality ID # [id number]: Final reports with documentation of one or more dose reduction techniques (e.g., Au
tomated exposure control, adjustment of the mA and/or kV according to patient size, use of iterative 
reconstruction technique)

 2011 [email or web address]- All Rights Reserved



Reading location - IP/workstation name: [email or web address]

## 2020-07-15 LAB
ANION GAP SERPL CALC-SCNC: 6 MMOL/L (ref 5–19)
BUN SERPL-MCNC: 12 MG/DL (ref 7–20)
CALCIUM: 9.3 MG/DL (ref 8.4–10.2)
CHLORIDE SERPL-SCNC: 98 MMOL/L (ref 98–107)
CO2 SERPL-SCNC: 30 MMOL/L (ref 22–30)
ERYTHROCYTE [DISTWIDTH] IN BLOOD BY AUTOMATED COUNT: 12 % (ref 11.5–14)
GLUCOSE SERPL-MCNC: 108 MG/DL (ref 75–110)
HCT VFR BLD CALC: 39.6 % (ref 37.9–51)
HGB BLD-MCNC: 14.2 G/DL (ref 13.5–17)
MCH RBC QN AUTO: 33.2 PG (ref 27–33.4)
MCHC RBC AUTO-ENTMCNC: 35.8 G/DL (ref 32–36)
MCV RBC AUTO: 93 FL (ref 80–97)
PLATELET # BLD: 208 10^3/UL (ref 150–450)
POTASSIUM SERPL-SCNC: 4.2 MMOL/L (ref 3.6–5)
RBC # BLD AUTO: 4.26 10^6/UL (ref 4.35–5.55)
WBC # BLD AUTO: 10.9 10^3/UL (ref 4–10.5)

## 2020-07-15 RX ADMIN — HEPARIN SODIUM SCH UNIT: 5000 INJECTION, SOLUTION INTRAVENOUS; SUBCUTANEOUS at 21:33

## 2020-07-15 RX ADMIN — VANCOMYCIN HYDROCHLORIDE SCH MLS/HR: 1 INJECTION, POWDER, LYOPHILIZED, FOR SOLUTION INTRAVENOUS at 09:07

## 2020-07-15 RX ADMIN — FAMOTIDINE SCH MG: 20 TABLET, FILM COATED ORAL at 21:33

## 2020-07-15 RX ADMIN — PIPERACILLIN AND TAZOBACTAM SCH MLS/HR: 3; .375 INJECTION, POWDER, LYOPHILIZED, FOR SOLUTION INTRAVENOUS; PARENTERAL at 05:53

## 2020-07-15 RX ADMIN — OXYCODONE AND ACETAMINOPHEN PRN TAB: 5; 325 TABLET ORAL at 15:02

## 2020-07-15 RX ADMIN — HEPARIN SODIUM SCH UNIT: 5000 INJECTION, SOLUTION INTRAVENOUS; SUBCUTANEOUS at 15:03

## 2020-07-15 RX ADMIN — HEPARIN SODIUM SCH: 5000 INJECTION, SOLUTION INTRAVENOUS; SUBCUTANEOUS at 05:59

## 2020-07-15 RX ADMIN — DIAZEPAM SCH MG: 5 TABLET ORAL at 11:47

## 2020-07-15 RX ADMIN — OXYCODONE AND ACETAMINOPHEN PRN TAB: 5; 325 TABLET ORAL at 09:20

## 2020-07-15 RX ADMIN — Medication SCH MG: at 09:07

## 2020-07-15 RX ADMIN — DIAZEPAM SCH MG: 5 TABLET ORAL at 23:37

## 2020-07-15 RX ADMIN — FOLIC ACID SCH MG: 1 TABLET ORAL at 09:07

## 2020-07-15 RX ADMIN — ALLOPURINOL SCH MG: 300 TABLET ORAL at 11:46

## 2020-07-15 RX ADMIN — PIPERACILLIN AND TAZOBACTAM SCH MLS/HR: 3; .375 INJECTION, POWDER, LYOPHILIZED, FOR SOLUTION INTRAVENOUS; PARENTERAL at 11:47

## 2020-07-15 RX ADMIN — OXYCODONE AND ACETAMINOPHEN PRN TAB: 5; 325 TABLET ORAL at 19:23

## 2020-07-15 RX ADMIN — FAMOTIDINE SCH MG: 20 TABLET, FILM COATED ORAL at 09:07

## 2020-07-15 RX ADMIN — DIAZEPAM SCH MG: 5 TABLET ORAL at 05:53

## 2020-07-15 RX ADMIN — OXYCODONE AND ACETAMINOPHEN PRN TAB: 5; 325 TABLET ORAL at 23:37

## 2020-07-15 RX ADMIN — PIPERACILLIN AND TAZOBACTAM SCH MLS/HR: 3; .375 INJECTION, POWDER, LYOPHILIZED, FOR SOLUTION INTRAVENOUS; PARENTERAL at 23:37

## 2020-07-15 RX ADMIN — DOCUSATE SODIUM SCH MG: 100 CAPSULE, LIQUID FILLED ORAL at 09:07

## 2020-07-15 RX ADMIN — SODIUM CHLORIDE PRN MLS/HR: 9 INJECTION, SOLUTION INTRAVENOUS at 19:24

## 2020-07-15 RX ADMIN — HYDROMORPHONE HYDROCHLORIDE PRN MG: 2 INJECTION INTRAMUSCULAR; INTRAVENOUS; SUBCUTANEOUS at 04:14

## 2020-07-15 RX ADMIN — DIAZEPAM SCH MG: 5 TABLET ORAL at 17:09

## 2020-07-15 RX ADMIN — VANCOMYCIN HYDROCHLORIDE SCH MLS/HR: 1 INJECTION, POWDER, LYOPHILIZED, FOR SOLUTION INTRAVENOUS at 01:18

## 2020-07-15 RX ADMIN — PIPERACILLIN AND TAZOBACTAM SCH MLS/HR: 3; .375 INJECTION, POWDER, LYOPHILIZED, FOR SOLUTION INTRAVENOUS; PARENTERAL at 17:09

## 2020-07-15 RX ADMIN — SODIUM CHLORIDE PRN MLS/HR: 9 INJECTION, SOLUTION INTRAVENOUS at 09:08

## 2020-07-15 NOTE — PDOC PROGRESS REPORT
Subjective


Progress Note for:: 07/15/20


Subjective:: 


LYNDSEY CISNEROS is a 49 year old male with a past medical history significant

for diverticulitis with perforation, alcohol dependence with continuous use, and

marijuana use who was admitted 7/14/2020 with refractory diverticulitis; failed 

outpatient p.o. Cipro.





Patient was seen on morning rounds.  He was found resting in bed, comfortably, 

on room air.  He reports continued intermittent lower abdominal discomfort.  He 

denies associated nausea, vomiting, diarrhea.


He reports that his abdominal discomfort improves with external pressure and 

bearing down.


He does request increase in pain medication regiment.


He further denies fever, chills, chest pain, palpitations, dyspnea, orthopnea.


He has no other questions or concerns at this time.


No concerns per nursing.


Reason For Visit: 


COLITIS








Physical Exam


Vital Signs: 


                                        











Temp Pulse Resp BP Pulse Ox


 


 100.2 F   93   16   119/68   100 


 


 07/15/20 14:54  07/15/20 14:54  07/15/20 14:54  07/15/20 14:54  07/15/20 14:54








                                 Intake & Output











 07/14/20 07/15/20 07/16/20





 06:59 06:59 06:59


 


Intake Total  2270 100


 


Balance  2270 100


 


Weight 100.698 kg 102.3 kg 











General appearance: PRESENT: no acute distress, cooperative, obese, well-

developed, well-nourished


Head exam: PRESENT: atraumatic, normocephalic


Eye exam: PRESENT: conjunctiva pink, EOMI, PERRLA.  ABSENT: scleral icterus


Mouth exam: PRESENT: moist, tongue midline


Respiratory exam: PRESENT: clear to auscultation stevie, symmetrical, unlabored.  

ABSENT: rales, rhonchi, wheezes


Cardiovascular exam: PRESENT: RRR, +S1, +S2.  ABSENT: diastolic murmur, rubs, 

systolic murmur


Vascular exam: PRESENT: normal capillary refill


GI/Abdominal exam: PRESENT: hyperactive bowel sounds, soft, tenderness.  ABSENT:

distended, guarding, mass, organolmegaly, rebound


Rectal exam: PRESENT: deferred


Extremities exam: PRESENT: full ROM.  ABSENT: calf tenderness, clubbing, pedal 

edema


Musculoskeletal exam: PRESENT: ambulatory


Neurological exam: PRESENT: alert, awake, oriented to person, oriented to place,

oriented to time, oriented to situation, CN II-XII grossly intact.  ABSENT: 

motor sensory deficit


Psychiatric exam: PRESENT: appropriate affect, normal mood.  ABSENT: homicidal 

ideation, suicidal ideation


Skin exam: PRESENT: dry, intact, warm.  ABSENT: cyanosis, rash





Results


Laboratory Results: 


                                        





                                 07/15/20 05:32 





                                 07/15/20 05:32 





                                        











  07/15/20 07/15/20





  05:32 05:32


 


WBC  10.9 H 


 


RBC  4.26 L 


 


Hgb  14.2 


 


Hct  39.6 


 


MCV  93 


 


MCH  33.2 


 


MCHC  35.8 


 


RDW  12.0 


 


Plt Count  208 


 


Sodium   134.1 L


 


Potassium   4.2


 


Chloride   98


 


Carbon Dioxide   30


 


Anion Gap   6


 


BUN   12


 


Creatinine   1.09


 


Est GFR (African Amer)   > 60


 


Glucose   108


 


Calcium   9.3











Impressions: 


                                        





Abdomen/Pelvis CT  07/14/20 00:00


IMPRESSION:  Persistent inflammatory changes associated with abnormal sigmoid 

colon.  Differential is refractory diverticulitis versus necrosis associated 

with underlying mass.  No abscess.


 








Chest X-Ray  07/14/20 17:04


IMPRESSION:  1. NO ACUTE RADIOGRAPHIC FINDING IN THE CHEST.


 














Assessment and Plan





- Diagnosis


(1) Diverticulitis


Qualifiers: 


   Diverticulitis site: large intestine   Diverticulitis bleeding: without 

bleeding   Diverticulitis complication: without perforation or abscess   

Qualified Code(s): K57.32 - Diverticulitis of large intestine without 

perforation or abscess without bleeding   


Is this a current diagnosis for this admission?: Yes   


Plan: 


CT imaging shows persistent inflammatory changes to the sigmoid colon consistent

with refractory diverticulitis.


Failed outpatient p.o. Cipro.


Blood cultures have no growth at 24 hours


Stool cultures pending; has not had a bowel movement.





Patient is admitted to the medical floor.


He was placed on empiric antibiotics.


Received 1 day of IV vancomycin; discontinued.


Continues on IV Zosyn; day #2.


Clear liquid diet; advance slowly as tolerated.


Antiemetics and analgesics as needed.








(2) Alcohol dependence


Is this a current diagnosis for this admission?: Yes   


Plan: 


Alcohol cessation is encouraged.


Patient is placed on daily thiamine and folic acid supplementation.


He is placed on scheduled Valium 5 mg every 6 hours.


Monitor for evidence of withdrawal.


PRN p.o. Ativan as needed for anxiety/agitation








(3) Marijuana use


Is this a current diagnosis for this admission?: Yes   


Plan: 


Cessation encouraged.








(4) Leukocytosis


Is this a current diagnosis for this admission?: Yes   


Plan: 


Improved; secondary to #1.


Evaluation management as above.








- Time


Time Spent with patient: 25-34 minutes


Medications reviewed and adjusted accordingly: Yes


Anticipated discharge: Home


Within: within 48 hours

## 2020-07-16 LAB
ERYTHROCYTE [DISTWIDTH] IN BLOOD BY AUTOMATED COUNT: 12.2 % (ref 11.5–14)
HCT VFR BLD CALC: 38.5 % (ref 37.9–51)
HGB BLD-MCNC: 13.7 G/DL (ref 13.5–17)
MCH RBC QN AUTO: 32.8 PG (ref 27–33.4)
MCHC RBC AUTO-ENTMCNC: 35.6 G/DL (ref 32–36)
MCV RBC AUTO: 92 FL (ref 80–97)
PLATELET # BLD: 218 10^3/UL (ref 150–450)
RBC # BLD AUTO: 4.17 10^6/UL (ref 4.35–5.55)
WBC # BLD AUTO: 10.3 10^3/UL (ref 4–10.5)

## 2020-07-16 RX ADMIN — HEPARIN SODIUM SCH UNIT: 5000 INJECTION, SOLUTION INTRAVENOUS; SUBCUTANEOUS at 05:41

## 2020-07-16 RX ADMIN — HEPARIN SODIUM SCH UNIT: 5000 INJECTION, SOLUTION INTRAVENOUS; SUBCUTANEOUS at 21:24

## 2020-07-16 RX ADMIN — Medication SCH MG: at 09:06

## 2020-07-16 RX ADMIN — HEPARIN SODIUM SCH: 5000 INJECTION, SOLUTION INTRAVENOUS; SUBCUTANEOUS at 13:10

## 2020-07-16 RX ADMIN — FOLIC ACID SCH MG: 1 TABLET ORAL at 09:07

## 2020-07-16 RX ADMIN — OXYCODONE AND ACETAMINOPHEN PRN TAB: 5; 325 TABLET ORAL at 21:24

## 2020-07-16 RX ADMIN — DIAZEPAM SCH MG: 5 TABLET ORAL at 12:44

## 2020-07-16 RX ADMIN — FAMOTIDINE SCH MG: 20 TABLET, FILM COATED ORAL at 09:07

## 2020-07-16 RX ADMIN — DOCUSATE SODIUM SCH MG: 100 CAPSULE, LIQUID FILLED ORAL at 09:07

## 2020-07-16 RX ADMIN — PIPERACILLIN AND TAZOBACTAM SCH MLS/HR: 3; .375 INJECTION, POWDER, LYOPHILIZED, FOR SOLUTION INTRAVENOUS; PARENTERAL at 12:44

## 2020-07-16 RX ADMIN — DIAZEPAM SCH MG: 5 TABLET ORAL at 05:40

## 2020-07-16 RX ADMIN — PIPERACILLIN AND TAZOBACTAM SCH MLS/HR: 3; .375 INJECTION, POWDER, LYOPHILIZED, FOR SOLUTION INTRAVENOUS; PARENTERAL at 17:01

## 2020-07-16 RX ADMIN — DIAZEPAM SCH MG: 5 TABLET ORAL at 17:01

## 2020-07-16 RX ADMIN — SODIUM CHLORIDE PRN MLS/HR: 9 INJECTION, SOLUTION INTRAVENOUS at 17:02

## 2020-07-16 RX ADMIN — SODIUM CHLORIDE PRN MLS/HR: 9 INJECTION, SOLUTION INTRAVENOUS at 04:27

## 2020-07-16 RX ADMIN — ALLOPURINOL SCH MG: 300 TABLET ORAL at 09:07

## 2020-07-16 RX ADMIN — FAMOTIDINE SCH MG: 20 TABLET, FILM COATED ORAL at 21:24

## 2020-07-16 RX ADMIN — OXYCODONE AND ACETAMINOPHEN PRN TAB: 5; 325 TABLET ORAL at 17:01

## 2020-07-16 RX ADMIN — OXYCODONE AND ACETAMINOPHEN PRN TAB: 5; 325 TABLET ORAL at 08:55

## 2020-07-16 RX ADMIN — PIPERACILLIN AND TAZOBACTAM SCH MLS/HR: 3; .375 INJECTION, POWDER, LYOPHILIZED, FOR SOLUTION INTRAVENOUS; PARENTERAL at 05:41

## 2020-07-16 RX ADMIN — OXYCODONE AND ACETAMINOPHEN PRN TAB: 5; 325 TABLET ORAL at 04:28

## 2020-07-16 NOTE — PDOC PROGRESS REPORT
Subjective


Progress Note for:: 07/16/20


Subjective:: 


LYNDSEY CISNEROS is a 49 year old male with a past medical history significant

for diverticulitis with perforation, alcohol dependence with continuous use, and

marijuana use who was admitted 7/14/2020 with refractory diverticulitis; failed 

outpatient p.o. Cipro.





Patient was seen on morning rounds.  He was found sitting up to the recliner, 

comfortably, on room air.  He reports continued intermittent lower abdominal 

discomfort; although improved.  He denies associated nausea, vomiting, diarrhea.

 Appetite is improved.


Did have a small bm today w/o severe pain as previously.


He further denies fever, chills, chest pain, palpitations, dyspnea, orthopnea.


He has no other questions or concerns at this time.


No concerns per nursing.


Reason For Visit: 


COLITIS








Physical Exam


Vital Signs: 


                                        











Temp Pulse Resp BP Pulse Ox


 


 98.0 F   71   16   117/77   99 


 


 07/16/20 15:19  07/16/20 15:19  07/16/20 15:19  07/16/20 15:19  07/16/20 15:19








                                 Intake & Output











 07/15/20 07/16/20 07/17/20





 06:59 06:59 06:59


 


Intake Total 2270 4950 1200


 


Balance 2270 4950 1200


 


Weight 102.3 kg 102.8 kg 











General appearance: PRESENT: no acute distress, cooperative, obese, well-

developed, well-nourished


Head exam: PRESENT: atraumatic, normocephalic


Eye exam: PRESENT: conjunctiva pink, EOMI, PERRLA.  ABSENT: scleral icterus


Mouth exam: PRESENT: moist, tongue midline


Respiratory exam: PRESENT: clear to auscultation stevie, symmetrical, unlabored.  

ABSENT: rales, rhonchi, wheezes


Cardiovascular exam: PRESENT: RRR.  ABSENT: diastolic murmur, rubs, systolic 

murmur


Vascular exam: PRESENT: normal capillary refill


GI/Abdominal exam: PRESENT: hyperactive bowel sounds, soft, tenderness.  ABSENT:

distended, guarding, mass, organolmegaly, rebound


Rectal exam: PRESENT: deferred


Extremities exam: PRESENT: full ROM.  ABSENT: calf tenderness, clubbing, pedal 

edema


Musculoskeletal exam: PRESENT: ambulatory


Neurological exam: PRESENT: alert, awake, oriented to person, oriented to place,

oriented to time, oriented to situation, CN II-XII grossly intact.  ABSENT: 

motor sensory deficit


Psychiatric exam: PRESENT: appropriate affect, normal mood.  ABSENT: homicidal 

ideation, suicidal ideation


Skin exam: PRESENT: dry, intact, warm.  ABSENT: cyanosis, rash





Results


Laboratory Results: 


                                        





                                 07/16/20 04:43 





                                 07/15/20 05:32 





                                        











  07/16/20





  04:43


 


WBC  10.3


 


RBC  4.17 L


 


Hgb  13.7


 


Hct  38.5


 


MCV  92


 


MCH  32.8


 


MCHC  35.6


 


RDW  12.2


 


Plt Count  218











Impressions: 


                                        





Abdomen/Pelvis CT  07/14/20 00:00


IMPRESSION:  Persistent inflammatory changes associated with abnormal sigmoid 

colon.  Differential is refractory diverticulitis versus necrosis associated 

with underlying mass.  No abscess.


 








Chest X-Ray  07/14/20 17:04


IMPRESSION:  1. NO ACUTE RADIOGRAPHIC FINDING IN THE CHEST.


 














Assessment and Plan





- Diagnosis


(1) Diverticulitis


Qualifiers: 


   Diverticulitis site: large intestine   Diverticulitis bleeding: without 

bleeding   Diverticulitis complication: without perforation or abscess   

Qualified Code(s): K57.32 - Diverticulitis of large intestine without per

foration or abscess without bleeding   


Is this a current diagnosis for this admission?: Yes   


Plan: 


Improved; afebrile, leukocytosis is resolved.  Decreased pain with improved 

appetite.


CT imaging shows persistent inflammatory changes to the sigmoid colon consistent

with refractory diverticulitis.


Failed outpatient p.o. Cipro.


Blood cultures have no growth at 48 hours


Stool cultures pending


Occult stool negative.





Patient is admitted to the medical floor.


He was placed on empiric antibiotics.


Received 1 day of IV vancomycin; discontinued.


Continues on IV Zosyn; day #3.


We will plan on transitioning to Augmentin tomorrow if he remains clinically 

improved.


Full liquid diet; advance slowly as tolerated.


Antiemetics and analgesics as needed.








(2) Alcohol dependence


Is this a current diagnosis for this admission?: Yes   


Plan: 


Alcohol cessation is encouraged.


Patient is placed on daily thiamine and folic acid supplementation.


He is placed on scheduled Valium 5 mg every 6 hours.


Monitor for evidence of withdrawal.


PRN p.o. Ativan as needed for anxiety/agitation








(3) Marijuana use


Is this a current diagnosis for this admission?: Yes   


Plan: 


Cessation encouraged.








(4) Leukocytosis


Is this a current diagnosis for this admission?: Yes   


Plan: 


Resolved; secondary to #1.


Evaluation management as above.








- Time


Time Spent with patient: 25-34 minutes


Medications reviewed and adjusted accordingly: Yes


Anticipated discharge: Home


Within: within 24 hours

## 2020-07-17 VITALS — SYSTOLIC BLOOD PRESSURE: 109 MMHG | DIASTOLIC BLOOD PRESSURE: 62 MMHG

## 2020-07-17 LAB
ANION GAP SERPL CALC-SCNC: 6 MMOL/L (ref 5–19)
BUN SERPL-MCNC: 9 MG/DL (ref 7–20)
CALCIUM: 9.2 MG/DL (ref 8.4–10.2)
CHLORIDE SERPL-SCNC: 104 MMOL/L (ref 98–107)
CO2 SERPL-SCNC: 25 MMOL/L (ref 22–30)
ERYTHROCYTE [DISTWIDTH] IN BLOOD BY AUTOMATED COUNT: 12.3 % (ref 11.5–14)
GLUCOSE SERPL-MCNC: 96 MG/DL (ref 75–110)
HCT VFR BLD CALC: 36.2 % (ref 37.9–51)
HGB BLD-MCNC: 13.1 G/DL (ref 13.5–17)
MCH RBC QN AUTO: 33.1 PG (ref 27–33.4)
MCHC RBC AUTO-ENTMCNC: 36.1 G/DL (ref 32–36)
MCV RBC AUTO: 92 FL (ref 80–97)
PLATELET # BLD: 220 10^3/UL (ref 150–450)
POTASSIUM SERPL-SCNC: 4.1 MMOL/L (ref 3.6–5)
RBC # BLD AUTO: 3.95 10^6/UL (ref 4.35–5.55)
WBC # BLD AUTO: 9.4 10^3/UL (ref 4–10.5)

## 2020-07-17 RX ADMIN — FOLIC ACID SCH MG: 1 TABLET ORAL at 09:46

## 2020-07-17 RX ADMIN — Medication SCH MG: at 09:46

## 2020-07-17 RX ADMIN — ALLOPURINOL SCH MG: 300 TABLET ORAL at 09:46

## 2020-07-17 RX ADMIN — OXYCODONE AND ACETAMINOPHEN PRN TAB: 5; 325 TABLET ORAL at 04:38

## 2020-07-17 RX ADMIN — HEPARIN SODIUM SCH UNIT: 5000 INJECTION, SOLUTION INTRAVENOUS; SUBCUTANEOUS at 05:55

## 2020-07-17 RX ADMIN — DOCUSATE SODIUM SCH MG: 100 CAPSULE, LIQUID FILLED ORAL at 09:46

## 2020-07-17 RX ADMIN — PIPERACILLIN AND TAZOBACTAM SCH MLS/HR: 3; .375 INJECTION, POWDER, LYOPHILIZED, FOR SOLUTION INTRAVENOUS; PARENTERAL at 05:55

## 2020-07-17 RX ADMIN — PIPERACILLIN AND TAZOBACTAM SCH MLS/HR: 3; .375 INJECTION, POWDER, LYOPHILIZED, FOR SOLUTION INTRAVENOUS; PARENTERAL at 00:20

## 2020-07-17 RX ADMIN — DIAZEPAM SCH MG: 5 TABLET ORAL at 00:19

## 2020-07-17 RX ADMIN — DIAZEPAM SCH MG: 5 TABLET ORAL at 05:55

## 2020-07-17 RX ADMIN — FAMOTIDINE SCH MG: 20 TABLET, FILM COATED ORAL at 09:46

## 2020-07-17 RX ADMIN — PIPERACILLIN AND TAZOBACTAM SCH MLS/HR: 3; .375 INJECTION, POWDER, LYOPHILIZED, FOR SOLUTION INTRAVENOUS; PARENTERAL at 11:10

## 2020-07-17 RX ADMIN — SODIUM CHLORIDE PRN MLS/HR: 9 INJECTION, SOLUTION INTRAVENOUS at 08:52

## 2020-07-17 RX ADMIN — OXYCODONE AND ACETAMINOPHEN PRN TAB: 5; 325 TABLET ORAL at 08:51

## 2020-07-17 RX ADMIN — DIAZEPAM SCH MG: 5 TABLET ORAL at 11:10

## 2020-07-19 NOTE — PDOC DISCHARGE SUMMARY
Impression





- Admit/DC Date/PCP


Admission Date/Primary Care Provider: 


  07/14/20 16:05





  





Discharge Date: 07/17/20





- Discharge Diagnosis


(1) Diverticulitis


Is this a current diagnosis for this admission?: Yes   





(2) Alcohol dependence


Is this a current diagnosis for this admission?: Yes   





(3) Marijuana use


Is this a current diagnosis for this admission?: Yes   





(4) Leukocytosis


Is this a current diagnosis for this admission?: Yes   





- Additional Information


Resuscitation Status: Full Code


Discharge Diet: As Tolerated, Regular


Discharge Activity: Activity As Tolerated, Balance Activity w/Rest, Slowly Inc

rease Activity


Referrals: 


[facility] [Provider Group] - 07/27/20 10:00 am


ZANDER WISEMAN MD [ACTIVE STAFF] - 07/24/20 1:00 pm (Follow up within 4-6 weeks 

for recurrent colitis/diverticulitis)


Prescriptions: 


Amoxicillin/Potassium Clav [Augmentin 875-125 Tablet] 1 tab PO BID #20 tab


Folic Acid [Folvite 1 mg Tablet] 1 mg PO DAILY #90 tablet


Oxycodone HCl/Acetaminophen [Percocet 5-325 mg Tablet] 1 tab PO Q4HP PRN #20 

tablet


 PRN Reason: 


Thiamine HCl [Thiamine 100 mg Tablet] 100 mg PO DAILY #90 tablet


Ondansetron [Zofran Odt 4 mg Tablet] 1 - 2 tab PO Q4HP PRN #10 tab.rapdis


 PRN Reason: 


Home Medications: 








Allopurinol [Zyloprim 300 mg Tablet] 300 mg PO DAILY #30 tablet 07/06/20 


Docusate Sodium [Colace 100 mg Capsule] 100 mg PO BID #60 capsule 07/06/20 


Acetaminophen [Tylenol 325 mg Tablet] 650 mg PO Q4HP PRN  tablet 07/17/20 


Amoxicillin/Potassium Clav [Augmentin 875-125 Tablet] 1 tab PO BID #20 tab 0

7/17/20 


Folic Acid [Folvite 1 mg Tablet] 1 mg PO DAILY #90 tablet 07/17/20 


Ondansetron [Zofran Odt 4 mg Tablet] 1 - 2 tab PO Q4HP PRN #10 tab.rapdis 

07/17/20 


Oxycodone HCl/Acetaminophen [Percocet 5-325 mg Tablet] 1 tab PO Q4HP PRN #20 

tablet 07/17/20 


Thiamine HCl [Thiamine 100 mg Tablet] 100 mg PO DAILY #90 tablet 07/17/20 











History of Present Illiness


History of Present Illness: 


LYNDSEY CISNEROS is a 49 year old male with a past medical history significant

for diverticulitis with perforation, alcohol dependence with continuous use, and

marijuana use who presents to the emergency department today with a complaint of

1 week of progressively worsening abdominal discomfort.  Patient was seen 7 days

ago in the emergency department and found to have diverticulitis by CT; 

discharged home on Cipro.  Patient reports that he initially improved x2 days 

and then has steadily worsened since.  Reports small-volume, soft stools, 

associated with abdominal cramping.  He denies nausea vomiting diarrhea and 

constipation.





Evaluation emergency department revealed stable vital signs, leukocytosis (WBC 

14.7; increased from last week), unremarkable chemistry, normal urinalysis, 

negative occult stool, and UDS positive for THC only.





CT imaging showed diverticulitis.  Per ED provider, he discussed with surgery 

who recommended hospitalist admission.


He was provided IV Zosyn and vancomycin and referred to the hospitalist service 

for admission and management of the above complaints and findings.








Hospital Course


Hospital Course: 


(1) Diverticulitis


Improved; afebrile, leukocytosis is resolved.  Decreased pain with improved 

appetite.  Tolerating p.o. intake.


CT imaging shows persistent inflammatory changes to the sigmoid colon consistent

with refractory diverticulitis.


Failed outpatient p.o. Cipro.


Blood cultures NGTD


Stool cultures pending


Occult stool negative.





Patient was admitted to the medical floor.


He was placed on empiric antibiotics.


Received 1 day of IV vancomycin; discontinued.


Received IV Zosyn x4 days


Transitioned to p.o. Augementin to complete full course of antibiotic therapy.


Diet was advanced slowly as tolerated.





(2) Alcohol dependence


Alcohol cessation is encouraged.


Patient is placed on daily thiamine and folic acid supplementation.


He received scheduled Valium 5 mg every 6 hours.


No evidence of withdrawal this admission





(3) Marijuana use


Cessation encouraged.





(4) Leukocytosis


Resolved; secondary to #1.


Evaluation management as above.





Physical Exam


Vital Signs: 


                                        











Temp Pulse Resp BP Pulse Ox


 


 98.4 F   72   18   109/62   98 


 


 07/17/20 12:15  07/17/20 12:15  07/17/20 12:15  07/17/20 12:15  07/17/20 12:15








                                 Intake & Output











 07/18/20 07/19/20 07/20/20





 06:59 06:59 06:59


 


Intake Total 100  


 


Balance 100  











General appearance: PRESENT: no acute distress, cooperative, obese, well-

developed, well-nourished


Head exam: PRESENT: atraumatic, normocephalic


Eye exam: PRESENT: conjunctiva pink, EOMI, PERRLA.  ABSENT: scleral icterus


Mouth exam: PRESENT: moist, tongue midline


Respiratory exam: PRESENT: clear to auscultation stevie, symmetrical, unlabored.  

ABSENT: rales, rhonchi, wheezes


Cardiovascular exam: PRESENT: RRR.  ABSENT: diastolic murmur, rubs, systolic 

murmur


Vascular exam: PRESENT: normal capillary refill


GI/Abdominal exam: PRESENT: normal bowel sounds, soft, tenderness - improved.  

ABSENT: distended, guarding, mass, organolmegaly, rebound


Rectal exam: PRESENT: deferred


Extremities exam: PRESENT: full ROM.  ABSENT: calf tenderness, clubbing, pedal 

edema


Musculoskeletal exam: PRESENT: ambulatory


Neurological exam: PRESENT: alert, awake, oriented to person, oriented to place,

oriented to time, oriented to situation, CN II-XII grossly intact.  ABSENT: 

motor sensory deficit


Psychiatric exam: PRESENT: appropriate affect, normal mood.  ABSENT: homicidal 

ideation, suicidal ideation


Skin exam: PRESENT: dry, intact, warm.  ABSENT: cyanosis, rash





Results


Laboratory Results: 


                                        











WBC  9.4 10^3/uL (4.0-10.5)   07/17/20  05:12    


 


RBC  3.95 10^6/uL (4.35-5.55)  L  07/17/20  05:12    


 


Hgb  13.1 g/dL (13.5-17.0)  L  07/17/20  05:12    


 


Hct  36.2 % (37.9-51.0)  L  07/17/20  05:12    


 


MCV  92 fl (80-97)   07/17/20  05:12    


 


MCH  33.1 pg (27.0-33.4)   07/17/20  05:12    


 


MCHC  36.1 g/dL (32.0-36.0)  H  07/17/20  05:12    


 


RDW  12.3 % (11.5-14.0)   07/17/20  05:12    


 


Plt Count  220 10^3/uL (150-450)   07/17/20  05:12    


 


Lymph % (Auto)  7.5 % (13-45)  L  07/14/20  08:26    


 


Mono % (Auto)  9.8 % (3-13)   07/14/20  08:26    


 


Eos % (Auto)  0.5 % (0-6)   07/14/20  08:26    


 


Baso % (Auto)  0.5 % (0-2)   07/14/20  08:26    


 


Absolute Neuts (auto)  12.0 10^3/uL (1.7-8.2)  H  07/14/20  08:26    


 


Absolute Lymphs (auto)  1.1 10^3/uL (0.5-4.7)   07/14/20  08:26    


 


Absolute Monos (auto)  1.4 10^3/uL (0.1-1.4)   07/14/20  08:26    


 


Absolute Eos (auto)  0.1 10^3/uL (0.0-0.6)   07/14/20  08:26    


 


Absolute Basos (auto)  0.1 10^3/uL (0.0-0.2)   07/14/20  08:26    


 


Seg Neutrophils %  81.7 % (42-78)  H  07/14/20  08:26    


 


Sodium  135.0 mmol/L (137-145)  L  07/17/20  05:12    


 


Potassium  4.1 mmol/L (3.6-5.0)   07/17/20  05:12    


 


Chloride  104 mmol/L ()   07/17/20  05:12    


 


Carbon Dioxide  25 mmol/L (22-30)   07/17/20  05:12    


 


Anion Gap  6  (5-19)   07/17/20  05:12    


 


BUN  9 mg/dL (7-20)   07/17/20  05:12    


 


Creatinine  0.98 mg/dL (0.52-1.25)   07/17/20  05:12    


 


Est GFR ( Amer)  > 60  (>60)   07/17/20  05:12    


 


Est GFR (MDRD) Non-Af  > 60  (>60)   07/17/20  05:12    


 


Glucose  96 mg/dL ()   07/17/20  05:12    


 


Lactic Acid  1.0 mmol/L (0.7-2.1)   07/14/20  09:44    


 


Calcium  9.2 mg/dL (8.4-10.2)   07/17/20  05:12    


 


Total Bilirubin  1.2 mg/dL (0.2-1.3)   07/14/20  08:26    


 


Direct Bilirubin  0.0 mg/dL (0.0-0.4)   07/14/20  08:26    


 


Neonat Total Bilirubin  Not Reportable   07/14/20  08:26    


 


Neonat Direct Bilirubin  Not Reportable   07/14/20  08:26    


 


Neonat Indirect Bili  Not Reportable   07/14/20  08:26    


 


AST  31 U/L (17-59)   07/14/20  08:26    


 


ALT  35 U/L (<50)   07/14/20  08:26    


 


Alkaline Phosphatase  95 U/L ()   07/14/20  08:26    


 


Total Protein  8.2 g/dL (6.3-8.2)   07/14/20  08:26    


 


Albumin  4.7 g/dL (3.5-5.0)   07/14/20  08:26    


 


Lipase  49.3 U/L ()   07/14/20  08:26    


 


Urine Color  DARK YELLOW   07/14/20  08:24    


 


Urine Appearance  SLIGHTLY-CLOUDY   07/14/20  08:24    


 


Urine pH  5.0  (5.0-9.0)   07/14/20  08:24    


 


Ur Specific Gravity  1.026   07/14/20  08:24    


 


Urine Protein  30 mg/dL (NEGATIVE)  H  07/14/20  08:24    


 


Urine Glucose (UA)  NEGATIVE mg/dL (NEGATIVE)   07/14/20  08:24    


 


Urine Ketones  NEGATIVE mg/dL (NEGATIVE)   07/14/20  08:24    


 


Urine Blood  NEGATIVE  (NEGATIVE)   07/14/20  08:24    


 


Urine Nitrite  NEGATIVE  (NEGATIVE)   07/14/20  08:24    


 


Urine Bilirubin  NEGATIVE  (NEGATIVE)   07/14/20  08:24    


 


Urine Urobilinogen  NEGATIVE mg/dL (<2.0)   07/14/20  08:24    


 


Ur Leukocyte Esterase  NEGATIVE  (NEGATIVE)   07/14/20  08:24    


 


Urine WBC (Auto)  1 /HPF  07/14/20  08:24    


 


Urine RBC (Auto)  0 /HPF  07/14/20  08:24    


 


U Hyaline Cast (Auto)  1 /LPF  07/14/20  08:24    


 


Urine Mucus (Auto)  MANY /LPF  07/14/20  08:24    


 


Urine Ascorbic Acid  NEGATIVE  (NEGATIVE)   07/14/20  08:24    


 


POC Stool Occult Blood  NEGATIVE  (NEGATIVE)   07/14/20  14:33    


 


Stool for White Cells  NO WBCs SEEN   07/17/20  08:30    


 


Urine Opiates Screen  NEGATIVE   07/14/20  08:24    


 


Urine Methadone Screen  NEGATIVE   07/14/20  08:24    


 


Ur Barbiturates Screen  NEGATIVE   07/14/20  08:24    


 


Ur Phencyclidine Scrn  NEGATIVE   07/14/20  08:24    


 


Ur Amphetamines Screen  NEGATIVE   07/14/20  08:24    


 


U Benzodiazepines Scrn  NEGATIVE   07/14/20  08:24    


 


Urine Cocaine Screen  NEGATIVE   07/14/20  08:24    


 


U Marijuana (THC) Screen  UNCONFIRMED POSITIVE   07/14/20  08:24    











Impressions: 


                                        





Abdomen/Pelvis CT  07/14/20 00:00


IMPRESSION:  Persistent inflammatory changes associated with abnormal sigmoid 

colon.  Differential is refractory diverticulitis versus necrosis associated 

with underlying mass.  No abscess.


 








Chest X-Ray  07/14/20 17:04


IMPRESSION:  1. NO ACUTE RADIOGRAPHIC FINDING IN THE CHEST.


 














Plan


Plan of Treatment: 


Patient is discharged home in stable condition.


He is advised to follow-up with his primary care provider within 1 week.


Follow-up with Dr. Keller as scheduled on 7/27/2020.


He is instructed to complete his full course of antibiotics.


He is encouraged to eat a soft, low residue diet and to advance slowly as 

tolerated.


Drink plenty of fluids.


Return to the emergency department as needed for concerning symptoms.


Time Spent: Greater than 30 Minutes





Stroke


Is this a Stroke Patient?: No





Acute Heart Failure





- **


Is this a Heart Failure Patient?: No

## 2020-07-25 ENCOUNTER — HOSPITAL ENCOUNTER (EMERGENCY)
Dept: HOSPITAL 62 - ER | Age: 50
Discharge: HOME | End: 2020-07-25
Payer: COMMERCIAL

## 2020-07-25 VITALS — SYSTOLIC BLOOD PRESSURE: 130 MMHG | DIASTOLIC BLOOD PRESSURE: 85 MMHG

## 2020-07-25 DIAGNOSIS — Z79.899: ICD-10-CM

## 2020-07-25 DIAGNOSIS — K57.92: ICD-10-CM

## 2020-07-25 DIAGNOSIS — M10.9: Primary | ICD-10-CM

## 2020-07-25 DIAGNOSIS — Z79.891: ICD-10-CM

## 2020-07-25 LAB
ADD MANUAL DIFF: NO
ALBUMIN SERPL-MCNC: 4.7 G/DL (ref 3.5–5)
ALP SERPL-CCNC: 95 U/L (ref 38–126)
ANION GAP SERPL CALC-SCNC: 8 MMOL/L (ref 5–19)
APPEARANCE UR: CLEAR
APTT PPP: YELLOW S
AST SERPL-CCNC: 35 U/L (ref 17–59)
BARBITURATES UR QL SCN: NEGATIVE
BASOPHILS # BLD AUTO: 0.1 10^3/UL (ref 0–0.2)
BASOPHILS NFR BLD AUTO: 0.9 % (ref 0–2)
BILIRUB DIRECT SERPL-MCNC: 0.1 MG/DL (ref 0–0.4)
BILIRUB SERPL-MCNC: 0.8 MG/DL (ref 0.2–1.3)
BILIRUB UR QL STRIP: NEGATIVE
BUN SERPL-MCNC: 15 MG/DL (ref 7–20)
CALCIUM: 10.4 MG/DL (ref 8.4–10.2)
CHLORIDE SERPL-SCNC: 98 MMOL/L (ref 98–107)
CO2 SERPL-SCNC: 30 MMOL/L (ref 22–30)
EOSINOPHIL # BLD AUTO: 0.1 10^3/UL (ref 0–0.6)
EOSINOPHIL NFR BLD AUTO: 1.2 % (ref 0–6)
ERYTHROCYTE [DISTWIDTH] IN BLOOD BY AUTOMATED COUNT: 12.3 % (ref 11.5–14)
GLUCOSE SERPL-MCNC: 108 MG/DL (ref 75–110)
GLUCOSE UR STRIP-MCNC: NEGATIVE MG/DL
HCT VFR BLD CALC: 43.3 % (ref 37.9–51)
HGB BLD-MCNC: 15.4 G/DL (ref 13.5–17)
KETONES UR STRIP-MCNC: NEGATIVE MG/DL
LYMPHOCYTES # BLD AUTO: 1.4 10^3/UL (ref 0.5–4.7)
LYMPHOCYTES NFR BLD AUTO: 15.5 % (ref 13–45)
MCH RBC QN AUTO: 32.9 PG (ref 27–33.4)
MCHC RBC AUTO-ENTMCNC: 35.6 G/DL (ref 32–36)
MCV RBC AUTO: 93 FL (ref 80–97)
METHADONE UR QL SCN: NEGATIVE
MONOCYTES # BLD AUTO: 0.7 10^3/UL (ref 0.1–1.4)
MONOCYTES NFR BLD AUTO: 7.8 % (ref 3–13)
NEUTROPHILS # BLD AUTO: 6.7 10^3/UL (ref 1.7–8.2)
NEUTS SEG NFR BLD AUTO: 74.6 % (ref 42–78)
NITRITE UR QL STRIP: NEGATIVE
PCP UR QL SCN: NEGATIVE
PH UR STRIP: 7 [PH] (ref 5–9)
PLATELET # BLD: 289 10^3/UL (ref 150–450)
POTASSIUM SERPL-SCNC: 4.7 MMOL/L (ref 3.6–5)
PROT SERPL-MCNC: 8.5 G/DL (ref 6.3–8.2)
PROT UR STRIP-MCNC: NEGATIVE MG/DL
RBC # BLD AUTO: 4.68 10^6/UL (ref 4.35–5.55)
SP GR UR STRIP: 1.02
TOTAL CELLS COUNTED % (AUTO): 100 %
URATE SERPL-MCNC: 5.1 MG/DL (ref 3.5–8.5)
URINE AMPHETAMINES SCREEN: NEGATIVE
URINE BENZODIAZEPINES SCREEN: (no result)
URINE COCAINE SCREEN: NEGATIVE
URINE MARIJUANA (THC) SCREEN: (no result)
UROBILINOGEN UR-MCNC: NEGATIVE MG/DL (ref ?–2)
WBC # BLD AUTO: 9 10^3/UL (ref 4–10.5)

## 2020-07-25 PROCEDURE — 80307 DRUG TEST PRSMV CHEM ANLYZR: CPT

## 2020-07-25 PROCEDURE — 71046 X-RAY EXAM CHEST 2 VIEWS: CPT

## 2020-07-25 PROCEDURE — 81001 URINALYSIS AUTO W/SCOPE: CPT

## 2020-07-25 PROCEDURE — 80053 COMPREHEN METABOLIC PANEL: CPT

## 2020-07-25 PROCEDURE — 96375 TX/PRO/DX INJ NEW DRUG ADDON: CPT

## 2020-07-25 PROCEDURE — 96374 THER/PROPH/DIAG INJ IV PUSH: CPT

## 2020-07-25 PROCEDURE — 85025 COMPLETE CBC W/AUTO DIFF WBC: CPT

## 2020-07-25 PROCEDURE — 99283 EMERGENCY DEPT VISIT LOW MDM: CPT

## 2020-07-25 PROCEDURE — 36415 COLL VENOUS BLD VENIPUNCTURE: CPT

## 2020-07-25 PROCEDURE — 84550 ASSAY OF BLOOD/URIC ACID: CPT

## 2020-07-25 PROCEDURE — S0028 INJECTION, FAMOTIDINE, 20 MG: HCPCS

## 2020-07-25 NOTE — ER DOCUMENT REPORT
ED General





- General


Chief Complaint: Leg Swelling


Stated Complaint: FEET/LEG PAIN


Time Seen by Provider: 20 11:23


Notes: 





50-year-old man presents to the emergency department with a history of treated 

for diverticulitis in the hospital over a week ago.  He was given Zosyn and 

vancomycin IV.  States that he got home before he left the hospital he was 

developing swelling in his ankles and feet this has persisted and become more 

painful.  He is presently taking Augmentin states that his abdominal symptoms 

have resolved however the swelling in his feet and ankles have been persistent 

and causing some difficulty when he walks.  Apparently diagnosed with possible 

gout prior to this hospital discharge, patient was put on allopurinol 300 mg.  

He states that the symptoms have continued to worsen and now having a difficult 

time walking.  Left ankle with swelling and tenderness.


TRAVEL OUTSIDE OF THE [place] IN LAST 30 DAYS: No





- Related Data


Allergies/Adverse Reactions: 


                                        





No Known Allergies Allergy (Verified 17 15:36)


   








Home Medications: Amoxicillin, Folic Acid, Zofran, Oxycodone, Allopurinol





Past Medical History





- Social History


Smoking Status: Unknown if Ever Smoked


Frequency of alcohol use: Social


Family History: Reviewed & Not Pertinent


Neurological Medical History: Denies: Hx Cerebrovascular Accident


Renal/ Medical History: Denies: Hx Peritoneal Dialysis


GI Medical History: Reports: Hx Diverticulitis, Hx Gastroesophageal Reflux Di

sease


Musculoskeletal Medical History: Reports Hx Arthritis - ankle from injury


Psychiatric Medical History: 


   Denies: Hx Depression


Past Surgical History: Reports: Hx Orthopedic Surgery - left ankle, Other - 

Percutaneous drain placement related to perforated diverticulum





- Immunizations


Hx Diphtheria, Pertussis, Tetanus Vaccination: No





Review of Systems





- Review of Systems


Notes: 





Constitutional: Negative for fever.


HENT: Negative for sore throat.


Eyes: Negative for visual changes.


Cardiovascular: Negative for chest pain.


Respiratory: Negative for shortness of breath.


Gastrointestinal: Negative for abdominal pain, vomiting or diarrhea.


Genitourinary: Negative for dysuria.


Musculoskeletal: + Bilateral lower extremity swelling and pain


Skin: Negative for rash.


Neurological: Negative for headaches, weakness or numbness.





10 point ROS negative except as marked above and in HPI.





Physical Exam





- Vital signs


Vitals: 


                                        











Temp Pulse Resp BP Pulse Ox


 


 98.5 F   92   16   132/87 H  93 


 


 20 10:51  20 10:51  20 10:51  20 10:51  20 10:51














- Notes


Notes: 





PHYSICAL EXAMINATION:


 


Physical Exam:


General: Well-nourished well-developed  in no acute distress


HEENT: NC/AT, pupils equal round and reactive to light, MM moist,nares clear, 

oropharynx clear, airway patent


Neck: supple, no adenopathy, no masses.  Good range of motion


Lungs: clear, no wheezing, no rales no rhonchi


CVS: Regular rate and rhythm no murmur gallop or rub


Abdomen: Soft, active, nontender, no masses, no hepatosplenomegaly


Ext: Bilateral lower extremity edema, tenderness to palpation, 2+ edema.


Neuro: Alert and responsive, moving all 4 extremities on command, cranial nerves

intact, no focal findings


Skin: Intact no open lesions, no rash


PSYCH: Normal mood, normal affect.


 








Course





- Re-evaluation


Re-evalutation: 





20 16:07


Patient was given IV Decadron and Toradol, noticing improvement in his 

discomfort and decrease in his swelling.  Is being discharged home with 

prednisone and Naprosyn.  I have asked the patient to follow-up with his primary

care doctor for further management if needed.  He and his significant other k

nowledge he will follow-up on Monday.





- Vital Signs


Vital signs: 


                                        











Temp Pulse Resp BP Pulse Ox


 


 98.5 F   92   16   132/87 H  93 


 


 20 10:51  20 10:51  20 10:51  20 10:51  20 10:51














- Laboratory


Result Diagrams: 


                                 20 11:45





                                 20 11:45


Laboratory results interpreted by me: 


                                        











  20





  11:45


 


Sodium  135.8 L


 


Calcium  10.4 H


 


Total Protein  8.5 H











20 14:58


I have reviewed laboratory data and used this information for the treatment 

decisions regarding the patient.





- Diagnostic Test


Radiology reviewed: Image reviewed, Reports reviewed


Radiology results interpreted by me: 





20 16:06


Chest x-ray 2 view: No acute cardiopulmonary findings.


20 16:07








Discharge





- Discharge


Clinical Impression: 


 Gouty arthritis of both ankles





Condition: Good


Disposition: HOME, SELF-CARE


Instructions:  Gout ([facility]), Gout Diet ([facility])


Additional Instructions: 


You were seen in the emergency department with swelling and pain in your ankles 

bilaterally.  Your symptoms are suggestive of gout.  You are being given 

medications prednisone and Naprosyn for treatment of the symptoms.  Please keep 

your ankles and feet elevated as much as possible and use the medications as 

prescribed and follow-up with your primary care doctor as needed.








HOME CARE INSTRUCTIONS & INFORMATION:  Thank you for choosing us for your 

medical needs. We hope you're satisfied with the care you received.  After you 

leave, you must properly care for your problem and, at the same time, observe 

its progress.  Any condition can change.  Some illnesses can change rapidly over

hours or days.  If your condition worsens, return to the Emergency Department or

see your physician promptly.





ABOUT YOUR X-RAYS AND EKG'S:   If you had an EKG or X-rays taken, they have been

read by the Emergency Physician. The X-rays and EKG's will also be read by a 

Radiologist or Cardiologist within 24 hours.  If discrepancies are noted, you 

will be notified by telephone.  Please be certain the ED has a correct telephone

number & address where you can be reached.  Also, realize that some fractures or

abnormalities do not show up on initial X-rays.  If your symptoms continue, see 

your physician.





ABOUT YOUR LABORATORY TEST:   If you had laboratory tests, the results have been

reviewed by the Emergency Physician.  Some test results (for example cultures) 

may not be available for several days.  You will be contacted if any test result

shows you need additional treatment.  Please be certain the ED has a correct 

telephone number and address where you can be reached.





ABOUT YOUR MEDICATIONS:  You will receive instructions on how to take your m

edicine on the prescription label you receive.  Additional information may be 

provided by the Pharmacy.  If you have questions afterwards, call the ED for 

clarification or further instructions.  Some prescribed medications may cause 

drowsiness.  Do not perform tasks such as driving a car or operating machinery 

without consulting your Pharmacist.  If you feel you need a refill of pain 

medication, your condition will need re-evaluation.  Please do not call for a 

refill of any medication.





ABOUT YOUR SIGNATURE:   Signature of this document acknowledges to followin. Understanding that you received emergency treatment and that you may be 

released before al medical problems are known or treated. Please be certain   

the ED has a correct phone number & address where you can be reached.


   2. Acknowledgement that you will arrange for follow-up care as recommended.


   3. Authorization for the Emergency Physician to provide information to your 

follow-up Physician in order to maximize your care.





AT ANY TIME, IF YOUR SYMPTOMS CHANGE SIGNIFICANTLY OR WORSEN OR YOU DEVELOP NEW 

SYMPTOMS, RETURN TO THE EMERGENCY DEPARTMENT IMMEDIATELY FOR RE-EVALUATION.





OUR GOAL IS TO PROVIDE EXCELLENT MEDICAL CARE!





WE HOPE THAT WE HAVE MET YOUR EXPECTATIONS DURING YOUR EMERGENCY DEPARTMENT 

VISIT AND THAT YOU FEEL YOU HAVE RECEIVED EXCELLENT CARE!











Prescriptions: 


Prednisone [Deltasone 20 mg Tablet] 20 mg PO BID #10 tablet


Naproxen [Naprosyn] 500 mg PO BID #20 tablet

## 2020-07-25 NOTE — RADIOLOGY REPORT (SQ)
EXAM DESCRIPTION:  CHEST 2 VIEWS



IMAGES COMPLETED DATE/TIME:  7/25/2020 11:54 am



REASON FOR STUDY:  Bilateral pedal edema



COMPARISON:  None.



EXAM PARAMETERS:  NUMBER OF VIEWS: two views

TECHNIQUE: Digital Frontal and Lateral radiographic views of the chest acquired.

RADIATION DOSE: NA

LIMITATIONS: none



FINDINGS:  LUNGS AND PLEURA: No opacities, masses or pneumothorax. No pleural effusion.

MEDIASTINUM AND HILAR STRUCTURES: No masses or contour abnormalities.

HEART AND VASCULAR STRUCTURES: Heart normal size.  No evidence for failure.

BONES: No acute findings.

HARDWARE: None in the chest.

OTHER: No other significant finding.



IMPRESSION:  NO ACUTE RADIOGRAPHIC FINDING IN THE CHEST.



TECHNICAL DOCUMENTATION:  JOB ID:  [id number]

 2011 [employer]- All Rights Reserved



Reading location - IP/workstation name: [email or web address]